# Patient Record
Sex: FEMALE | Race: OTHER | HISPANIC OR LATINO | Employment: FULL TIME | URBAN - METROPOLITAN AREA
[De-identification: names, ages, dates, MRNs, and addresses within clinical notes are randomized per-mention and may not be internally consistent; named-entity substitution may affect disease eponyms.]

---

## 2019-05-30 PROCEDURE — 99284 EMERGENCY DEPT VISIT MOD MDM: CPT

## 2019-05-31 ENCOUNTER — HOSPITAL ENCOUNTER (EMERGENCY)
Facility: MEDICAL CENTER | Age: 49
End: 2019-05-31
Attending: EMERGENCY MEDICINE
Payer: COMMERCIAL

## 2019-05-31 VITALS
TEMPERATURE: 96.7 F | HEART RATE: 48 BPM | RESPIRATION RATE: 12 BRPM | WEIGHT: 180.78 LBS | BODY MASS INDEX: 33.27 KG/M2 | DIASTOLIC BLOOD PRESSURE: 65 MMHG | SYSTOLIC BLOOD PRESSURE: 140 MMHG | OXYGEN SATURATION: 99 % | HEIGHT: 62 IN

## 2019-05-31 DIAGNOSIS — S05.01XA ABRASION OF RIGHT CORNEA, INITIAL ENCOUNTER: ICD-10-CM

## 2019-05-31 DIAGNOSIS — T15.01XA FOREIGN BODY OF RIGHT CORNEA, INITIAL ENCOUNTER: ICD-10-CM

## 2019-05-31 PROCEDURE — 700101 HCHG RX REV CODE 250: Performed by: EMERGENCY MEDICINE

## 2019-05-31 PROCEDURE — 65220 REMOVE FOREIGN BODY FROM EYE: CPT

## 2019-05-31 PROCEDURE — 65205 REMOVE FOREIGN BODY FROM EYE: CPT

## 2019-05-31 RX ORDER — CIPROFLOXACIN HYDROCHLORIDE 3.5 MG/ML
1 SOLUTION/ DROPS TOPICAL ONCE
Status: DISCONTINUED | OUTPATIENT
Start: 2019-05-31 | End: 2019-05-31

## 2019-05-31 RX ORDER — TETRACAINE HYDROCHLORIDE 5 MG/ML
1 SOLUTION OPHTHALMIC ONCE
Status: COMPLETED | OUTPATIENT
Start: 2019-05-31 | End: 2019-05-31

## 2019-05-31 RX ORDER — CIPROFLOXACIN HYDROCHLORIDE 3.5 MG/ML
1 SOLUTION/ DROPS TOPICAL 4 TIMES DAILY
Qty: 1 BOTTLE | Refills: 0 | Status: SHIPPED | OUTPATIENT
Start: 2019-05-31 | End: 2020-07-20

## 2019-05-31 RX ADMIN — TETRACAINE HYDROCHLORIDE 1 DROP: 5 SOLUTION OPHTHALMIC at 00:30

## 2019-05-31 RX ADMIN — FLUORESCEIN SODIUM 1 MG: 1 STRIP OPHTHALMIC at 00:30

## 2019-05-31 ASSESSMENT — ENCOUNTER SYMPTOMS
NECK PAIN: 0
DIZZINESS: 0
DOUBLE VISION: 0
BLURRED VISION: 0
FEVER: 0
HEADACHES: 0
CHILLS: 0
ABDOMINAL PAIN: 0
VOMITING: 0
EYE PAIN: 1
EYE REDNESS: 1
NAUSEA: 0

## 2019-05-31 NOTE — ED PROVIDER NOTES
ED Provider Note    Means of arrival: private vehicle  History obtained from: patient   History limited by: none    CHIEF COMPLAINT  Chief Complaint   Patient presents with   • Eye Pain     started around 7 aylin tonight     • Eye Drainage     watery    • Burning Eyes     R eye  started around dinner time 7 aylin tonight        HPI  Bruna Masterson is a 48 y.o. female who presents to the Emergency Department for right eye pain.  Patient reports that she is a contact lens wearer at baseline.  Earlier this evening around 7 she felt as if something was in her right eye.  Therefore she removed her contacts and rinsed out her eyes thoroughly with continued to have a foreign body sensation in her right eye, her right eye became red and irritated and therefore she came in for further evaluation.  She describes the irritation in the right eye as burning and moderate in severity.  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  Currently she is wearing her glasses.  She denies visual disturbance.    REVIEW OF SYSTEMS  Review of Systems   Constitutional: Negative for chills and fever.   Eyes: Positive for pain and redness. Negative for blurred vision and double vision.   Gastrointestinal: Negative for abdominal pain, nausea and vomiting.   Genitourinary: Negative for dysuria.   Musculoskeletal: Negative for neck pain.   Skin: Negative for rash.   Neurological: Negative for dizziness and headaches.   All other systems reviewed and are negative.        PAST MEDICAL HISTORY   none    SURGICAL HISTORY   has a past surgical history that includes other orthopedic surgery.    SOCIAL HISTORY  Social History   Substance Use  "Topics   • Smoking status: Never Smoker   • Smokeless tobacco: Never Used   • Alcohol use Yes      Comment: once an a while      History   Drug Use No       FAMILY HISTORY  History reviewed. No pertinent family history.    CURRENT MEDICATIONS  Home Medications     Reviewed by Carolina Ambriz R.N. (Registered Nurse) on 05/31/19 at 0000  Med List Status: Partial   Medication Last Dose Status        Patient Cb Taking any Medications                       ALLERGIES  Allergies   Allergen Reactions   • Amoxicillin Anaphylaxis, Hives and Swelling       PHYSICAL EXAM  VITAL SIGNS: /65   Pulse (!) 48   Temp 35.9 °C (96.7 °F) (Temporal)   Resp 12   Ht 1.575 m (5' 2\")   Wt 82 kg (180 lb 12.4 oz)   LMP 05/19/2019 (Exact Date)   SpO2 99%   BMI 33.06 kg/m²   Vitals reviewed by myself.  Physical Exam   Constitutional: She is oriented to person, place, and time and well-developed, well-nourished, and in no distress. No distress.   HENT:   Head: Normocephalic and atraumatic.   Pupils are equal and reactive bilaterally.  Right conjunctive is mildly injected.  There is no swelling or erythema to the eyelids.  Visual acuity is 20/20 in the right eye, 20/30 in the left eye, and 20/20 in both eyes with glasses on.  Pressure is 15 in the left eye and 13 in the right eye.  Fluorescein staining reveals corneal abrasions to the right eye with a small punctate area noted overlying the iris at 11 o'clock.  Negative Aria sign.  Upon slit-lamp examination this small punctate area of fluorescein uptake is consistent with a metallic foreign body.   Neck: Normal range of motion.   Cardiovascular: Normal rate and regular rhythm.    Pulmonary/Chest: Effort normal.   Musculoskeletal: Normal range of motion.   Neurological: She is alert and oriented to person, place, and time.   Skin: Skin is warm and dry.         DIAGNOSTIC STUDIES /  LABS  none    PROCEDURES  Foreign Body Removal Procedure Note    Indication: Foreign metallic " body in the right eye    Procedure: The area of the foreign body was anesthetized using tetracaine eyedrops.  The metallic foreign body was then removed using an 18-gauge needle I was able to flick the metallic foreign body out of the eye.  Patient tolerated the procedure well.  Repeat exam under fluoroscopy seen after removal of the metallic foreign body demonstrates negative Aria sign and no evidence of globe rupture.    The patient tolerated the procedure well.    Complications: None    COURSE & MEDICAL DECISION MAKING  Nursing notes, VS, PMSFHx reviewed in chart.    Patient is a 48-year-old female who comes in for eye pain.  On physical exam she is well-appearing with vitals normal limits.  Exam is consistent with retained metallic foreign body and corneal abrasions in the right eye.  I remove the foreign body, please see procedure note above for details.  Repeat fluorescein staining demonstrates negative Aria sign and no evidence of globe rupture.  For her corneal abrasions as patient is a contact lens wearer I will start her on ciprofloxacin ophthalmic drops and have her follow-up with her .  Patient is agreeable to this plan.  She is then given strict return precautions and discharged home in stable condition.      FINAL IMPRESSION  1. Abrasion of right cornea, initial encounter    2. Foreign body of right cornea, initial encounter

## 2019-05-31 NOTE — ED TRIAGE NOTES
Pt c/o R eye pain, watery and burning sensation that started around 7 aylin tonight  Unknown reason for this per pt   Unable to sleep due to increased pain   Has flushed out eye multiple times w/out effect

## 2019-05-31 NOTE — ED NOTES
Pt discharged with instructions and  1 script.  Pt advised to follow up with her eye doctor in 2 days for recheck.  Pt verbalized understanding of discharge instructions.  Pt ambulated out of ED alone.

## 2019-05-31 NOTE — DISCHARGE INSTRUCTIONS
Please follow-up with your eye doctor within the next 1 to 2 days, do not wear your contacts until this is resolved

## 2020-05-13 ENCOUNTER — TELEPHONE (OUTPATIENT)
Dept: SCHEDULING | Facility: IMAGING CENTER | Age: 50
End: 2020-05-13

## 2020-06-09 ENCOUNTER — TELEPHONE (OUTPATIENT)
Dept: SCHEDULING | Facility: IMAGING CENTER | Age: 50
End: 2020-06-09

## 2020-07-20 ENCOUNTER — OFFICE VISIT (OUTPATIENT)
Dept: MEDICAL GROUP | Facility: MEDICAL CENTER | Age: 50
End: 2020-07-20
Payer: COMMERCIAL

## 2020-07-20 VITALS
WEIGHT: 191 LBS | HEART RATE: 61 BPM | BODY MASS INDEX: 36.06 KG/M2 | HEIGHT: 61 IN | TEMPERATURE: 96.9 F | SYSTOLIC BLOOD PRESSURE: 128 MMHG | DIASTOLIC BLOOD PRESSURE: 76 MMHG | OXYGEN SATURATION: 97 %

## 2020-07-20 DIAGNOSIS — Z11.1 SCREENING FOR TUBERCULOSIS: ICD-10-CM

## 2020-07-20 DIAGNOSIS — N92.6 IRREGULAR MENSES: ICD-10-CM

## 2020-07-20 DIAGNOSIS — E78.5 HYPERLIPIDEMIA WITH TARGET LDL LESS THAN 130: ICD-10-CM

## 2020-07-20 DIAGNOSIS — R63.5 WEIGHT GAIN: ICD-10-CM

## 2020-07-20 DIAGNOSIS — Z00.00 ANNUAL PHYSICAL EXAM: ICD-10-CM

## 2020-07-20 DIAGNOSIS — E66.9 OBESITY (BMI 30-39.9): ICD-10-CM

## 2020-07-20 DIAGNOSIS — Z01.84 IMMUNITY STATUS TESTING: ICD-10-CM

## 2020-07-20 DIAGNOSIS — Z12.11 SCREEN FOR COLON CANCER: ICD-10-CM

## 2020-07-20 PROBLEM — M22.2X1 RIGHT PATELLOFEMORAL SYNDROME: Status: ACTIVE | Noted: 2017-11-10

## 2020-07-20 PROBLEM — N63.20 MASS OF LEFT BREAST: Status: ACTIVE | Noted: 2017-05-04

## 2020-07-20 PROCEDURE — 99386 PREV VISIT NEW AGE 40-64: CPT | Performed by: NURSE PRACTITIONER

## 2020-07-20 PROCEDURE — 86580 TB INTRADERMAL TEST: CPT | Performed by: NURSE PRACTITIONER

## 2020-07-20 RX ORDER — CICLOPIROX 80 MG/ML
SOLUTION TOPICAL
COMMUNITY
Start: 2020-05-14 | End: 2020-07-20

## 2020-07-20 SDOH — HEALTH STABILITY: MENTAL HEALTH: HOW OFTEN DO YOU HAVE A DRINK CONTAINING ALCOHOL?: 4 OR MORE TIMES A WEEK

## 2020-07-20 ASSESSMENT — PATIENT HEALTH QUESTIONNAIRE - PHQ9: CLINICAL INTERPRETATION OF PHQ2 SCORE: 0

## 2020-07-20 NOTE — LETTER
[facility]  SHANNON Naylor.  [address]  [place] [address]  Fax: [phone]   Authorization for Release/Disclosure of   Protected Health Information   Name: AMY GUERRA : 1970 SSN: [id number]   Address: [address] [place] [address] [address] [place] [address] Phone:    [phone] (home)    I authorize the entity listed below to release/disclose the PHI below to:   [facility]/ANITRA Naylor and ANITRA Naylor   Provider or Entity Name:  Ketty Crow M.D.   Address   [facility], [place] [address], [address] [place] [address] Phone: [phone]    Fax: [phone]   Reason for request: continuity of care   Information to be released:    [  ] LAST COLONOSCOPY,  including any PATH REPORT and follow-up  [  ] LAST FIT/COLOGUARD RESULT [  ] LAST DEXA  [  ] LAST MAMMOGRAM  [  ] LAST PAP  [  ] LAST LABS [  ] RETINA EXAM REPORT  [  ] IMMUNIZATION RECORDS  [ XXX ] Release all info      [  ] Check here and initial the line next to each item to release ALL health information INCLUDING  _____ Care and treatment for drug and / or alcohol abuse  _____ HIV testing, infection status, or AIDS  _____ Genetic Testing    DATES OF SERVICE OR TIME PERIOD TO BE DISCLOSED: _____________  I understand and acknowledge that:  * This Authorization may be revoked at any time by you in writing, except if your health information has already been used or disclosed.  * Your health information that will be used or disclosed as a result of you signing this authorization could be re-disclosed by the recipient. If this occurs, your re-disclosed health information may no longer be protected by State or Federal laws.  * You may refuse to sign this Authorization. Your refusal will not affect your ability to obtain treatment.  * This Authorization becomes effective upon signing and will  on (date) __________.      If no date is indicated, this Authorization will   one (1) year from the signature date.    Name: Bruna Masterson    Signature:   Date:     7/20/2020       PLEASE FAX REQUESTED RECORDS BACK TO: [phone]

## 2020-07-20 NOTE — PROGRESS NOTES
"Bruna Masterson is a 49 y.o. female here to establish care and discuss the following:  HPI:    Irregular menses  Patient reports having no menses for about 11 months last year, then had a cycle. Since then she has been getting her menses every few months. She would like to have hormone levels checked to eval for menopause.     Weight gain  Patient reports weight gain despite not changing activity level or diet.     Current medicines (including changes today)  No current outpatient medications on file.     No current facility-administered medications for this visit.      She  has a past medical history of Allergy and Hyperlipidemia.  She  has a past surgical history that includes other orthopedic surgery.  Social History     Tobacco Use   • Smoking status: Never Smoker   • Smokeless tobacco: Never Used   Substance Use Topics   • Alcohol use: Yes     Frequency: 4 or more times a week     Comment: once an a while   • Drug use: No     Social History     Social History Narrative   • Not on file     Family History   Problem Relation Age of Onset   • Glaucoma Mother    • Arthritis Mother    • Heart Disease Father         CHF, MI   • Hyperlipidemia Father    • Heart Disease Paternal Aunt         CVA   • Heart Disease Paternal Uncle         pacers   • Cancer Cousin 60        breast     Family Status   Relation Name Status   • Mo  Alive   • Fa 71    • Bro  Alive   • PAunt  (Not Specified)   • PUnc  (Not Specified)   • Cou  Alive         ROS  No chest pain, no abdominal pain, no rash.  Positive ROS as per HPI.  All other systems reviewed and are negative      Objective:     /76 (BP Location: Right arm, Patient Position: Sitting, BP Cuff Size: Adult)   Pulse 61   Temp 36.1 °C (96.9 °F) (Temporal)   Ht 1.549 m (5' 1\")   Wt 86.6 kg (191 lb)   SpO2 97%  Body mass index is 36.09 kg/m².  Physical Exam:    Constitutional: Alert, no distress.  Skin: Warm, dry, good turgor, no rashes in visible areas.  Eye: Equal, " round and reactive, conjunctiva clear, lids normal.  ENMT: Lips without lesions, good dentition, oropharynx clear.  Neck: Trachea midline, no masses, no thyromegaly. No cervical or supraclavicular lymphadenopathy.  Respiratory: Unlabored respiratory effort, lungs clear to auscultation, no wheezes, no ronchi.  Cardiovascular: Normal S1, S2, no murmur, no edema.  Abdomen: Obese. Soft, non-tender, no masses, no hepatosplenomegaly.  Psych: Alert and oriented x3, normal affect and mood.      Assessment and Plan:   The following treatment plan was discussed    1. Annual physical exam  Check labs, call with results  - CBC WITH DIFFERENTIAL; Future  - Comp Metabolic Panel; Future  - HEMOGLOBIN A1C; Future  - Lipid Profile; Future  - TSH WITH REFLEX TO FT4; Future  - VITAMIN D,25 HYDROXY; Future  - FSH/LH; Future  - ESTRADIOL; Future    2. Irregular menses  Unstable  Likely post-menopausal vs perimenopause  Check labs  - FSH/LH; Future  - ESTRADIOL; Future    3. Weight gain  Unstable  Check labs  - HEMOGLOBIN A1C; Future  - TSH WITH REFLEX TO FT4; Future    4. Hyperlipidemia with target LDL less than 130  - Lipid Profile; Future    5. Screen for colon cancer  - REFERRAL TO GI FOR COLONOSCOPY    6. Screening for tuberculosis  - PPD SKIN TEST    7. Immunity status testing  - SARS CoV-2 Ab, Total; Future      Records reviewed via care everywhere  Followup: Return in about 1 year (around 7/20/2021), or if symptoms worsen or fail to improve.    I have placed the below orders and discussed them with an approved delegating provider. The MA is performing the below orders under the direction of Dr. Nguyen

## 2020-07-20 NOTE — ASSESSMENT & PLAN NOTE
Patient reports having no menses for about 11 months last year, then had a cycle. Since then she has been getting her menses every few months. She would like to have hormone levels checked to eval for menopause.

## 2020-07-20 NOTE — NON-PROVIDER
Zay Masterson is a 49 y.o. female here for a non-provider visit for PPD placement -- Step 1 of 2    Reason for PPD:  work requirement    1. TB evaluation questionnaire completed by patient? Yes      -  If any answers marked yes did you contact a provider prior to placing? Not Indicated  2.  Patient notified to return to clinic for reading on:between 7/22/2020 at 11:58 am - 7/23/2020 at 11:58 am  3.  PPD Placement documentation completed on TB evaluation questionnaire? Yes  4.  Location of TB evaluation questionnaire filed: Draw room in TB folders

## 2020-07-22 ENCOUNTER — NON-PROVIDER VISIT (OUTPATIENT)
Dept: MEDICAL GROUP | Facility: MEDICAL CENTER | Age: 50
End: 2020-07-22

## 2020-07-22 LAB — TB WHEAL 3D P 5 TU DIAM: 0 MM

## 2020-07-22 NOTE — NON-PROVIDER
Zay Masterson is a 49 y.o. female here for a non-provider visit for PPD reading -- Step 1 of 1.      1.  Resulted in Epic under enter/edit results? Yes   2.  TB evaluation questionnaire scanned into chart and original given to patient?Yes      3. Was induration greater than 0 mm? No

## 2020-07-28 ENCOUNTER — HOSPITAL ENCOUNTER (OUTPATIENT)
Dept: LAB | Facility: MEDICAL CENTER | Age: 50
End: 2020-07-28
Attending: NURSE PRACTITIONER
Payer: COMMERCIAL

## 2020-07-28 DIAGNOSIS — Z01.84 IMMUNITY STATUS TESTING: ICD-10-CM

## 2020-07-28 DIAGNOSIS — N92.6 IRREGULAR MENSES: ICD-10-CM

## 2020-07-28 DIAGNOSIS — R63.5 WEIGHT GAIN: ICD-10-CM

## 2020-07-28 DIAGNOSIS — E78.5 HYPERLIPIDEMIA WITH TARGET LDL LESS THAN 130: ICD-10-CM

## 2020-07-28 DIAGNOSIS — Z00.00 ANNUAL PHYSICAL EXAM: ICD-10-CM

## 2020-07-28 LAB
25(OH)D3 SERPL-MCNC: 33 NG/ML (ref 30–100)
ALBUMIN SERPL BCP-MCNC: 4 G/DL (ref 3.2–4.9)
ALBUMIN/GLOB SERPL: 1.2 G/DL
ALP SERPL-CCNC: 76 U/L (ref 30–99)
ALT SERPL-CCNC: 13 U/L (ref 2–50)
ANION GAP SERPL CALC-SCNC: 10 MMOL/L (ref 7–16)
AST SERPL-CCNC: 24 U/L (ref 12–45)
BASOPHILS # BLD AUTO: 1.7 % (ref 0–1.8)
BASOPHILS # BLD: 0.08 K/UL (ref 0–0.12)
BILIRUB SERPL-MCNC: 0.5 MG/DL (ref 0.1–1.5)
BUN SERPL-MCNC: 14 MG/DL (ref 8–22)
CALCIUM SERPL-MCNC: 9.3 MG/DL (ref 8.4–10.2)
CHLORIDE SERPL-SCNC: 104 MMOL/L (ref 96–112)
CHOLEST SERPL-MCNC: 248 MG/DL (ref 100–199)
CO2 SERPL-SCNC: 28 MMOL/L (ref 20–33)
CREAT SERPL-MCNC: 0.91 MG/DL (ref 0.5–1.4)
EOSINOPHIL # BLD AUTO: 0.2 K/UL (ref 0–0.51)
EOSINOPHIL NFR BLD: 4.2 % (ref 0–6.9)
ERYTHROCYTE [DISTWIDTH] IN BLOOD BY AUTOMATED COUNT: 42.5 FL (ref 35.9–50)
EST. AVERAGE GLUCOSE BLD GHB EST-MCNC: 111 MG/DL
ESTRADIOL SERPL-MCNC: 12.2 PG/ML
FASTING STATUS PATIENT QL REPORTED: NORMAL
FSH SERPL-ACNC: 67.2 MIU/ML
GLOBULIN SER CALC-MCNC: 3.4 G/DL (ref 1.9–3.5)
GLUCOSE SERPL-MCNC: 90 MG/DL (ref 65–99)
HBA1C MFR BLD: 5.5 % (ref 0–5.6)
HCT VFR BLD AUTO: 42.6 % (ref 37–47)
HDLC SERPL-MCNC: 85 MG/DL
HGB BLD-MCNC: 14.3 G/DL (ref 12–16)
IMM GRANULOCYTES # BLD AUTO: 0.02 K/UL (ref 0–0.11)
IMM GRANULOCYTES NFR BLD AUTO: 0.4 % (ref 0–0.9)
LDLC SERPL CALC-MCNC: 139 MG/DL
LH SERPL-ACNC: 44.2 IU/L
LYMPHOCYTES # BLD AUTO: 1.04 K/UL (ref 1–4.8)
LYMPHOCYTES NFR BLD: 21.9 % (ref 22–41)
MCH RBC QN AUTO: 31 PG (ref 27–33)
MCHC RBC AUTO-ENTMCNC: 33.6 G/DL (ref 33.6–35)
MCV RBC AUTO: 92.4 FL (ref 81.4–97.8)
MONOCYTES # BLD AUTO: 0.51 K/UL (ref 0–0.85)
MONOCYTES NFR BLD AUTO: 10.8 % (ref 0–13.4)
NEUTROPHILS # BLD AUTO: 2.89 K/UL (ref 2–7.15)
NEUTROPHILS NFR BLD: 61 % (ref 44–72)
NRBC # BLD AUTO: 0 K/UL
NRBC BLD-RTO: 0 /100 WBC
PLATELET # BLD AUTO: 250 K/UL (ref 164–446)
PMV BLD AUTO: 10.6 FL (ref 9–12.9)
POTASSIUM SERPL-SCNC: 4 MMOL/L (ref 3.6–5.5)
PROT SERPL-MCNC: 7.4 G/DL (ref 6–8.2)
RBC # BLD AUTO: 4.61 M/UL (ref 4.2–5.4)
SARS-COV-2 AB SERPL QL IA: NORMAL
SODIUM SERPL-SCNC: 142 MMOL/L (ref 135–145)
TRIGL SERPL-MCNC: 122 MG/DL (ref 0–149)
TSH SERPL DL<=0.005 MIU/L-ACNC: 4.19 UIU/ML (ref 0.38–5.33)
WBC # BLD AUTO: 4.7 K/UL (ref 4.8–10.8)

## 2020-07-28 PROCEDURE — 36415 COLL VENOUS BLD VENIPUNCTURE: CPT

## 2020-07-28 PROCEDURE — 85025 COMPLETE CBC W/AUTO DIFF WBC: CPT

## 2020-07-28 PROCEDURE — 84443 ASSAY THYROID STIM HORMONE: CPT

## 2020-07-28 PROCEDURE — 83001 ASSAY OF GONADOTROPIN (FSH): CPT

## 2020-07-28 PROCEDURE — 82306 VITAMIN D 25 HYDROXY: CPT

## 2020-07-28 PROCEDURE — 82670 ASSAY OF TOTAL ESTRADIOL: CPT

## 2020-07-28 PROCEDURE — 86769 SARS-COV-2 COVID-19 ANTIBODY: CPT

## 2020-07-28 PROCEDURE — 83002 ASSAY OF GONADOTROPIN (LH): CPT

## 2020-07-28 PROCEDURE — 80053 COMPREHEN METABOLIC PANEL: CPT

## 2020-07-28 PROCEDURE — 83036 HEMOGLOBIN GLYCOSYLATED A1C: CPT

## 2020-07-28 PROCEDURE — 80061 LIPID PANEL: CPT

## 2020-08-21 ENCOUNTER — TELEPHONE (OUTPATIENT)
Dept: MEDICAL GROUP | Facility: MEDICAL CENTER | Age: 50
End: 2020-08-21

## 2020-08-21 NOTE — TELEPHONE ENCOUNTER
----- Message from ANITRA Naylor sent at 8/21/2020 10:40 AM PDT -----  Please notify patient that their complete blood count, kidney and liver function, thyroid function, fasting blood sugar and diabetes test,vitamin D, and electrolytes were normal.    Her COVID antibodies were negative, so no recent infection    Her cholesterol is elevated, she should increase vegetables in diet and exercise, decrease fat in diet.     Her Hormone levels indicate she is likely in menopause.     ANITRA Naylor

## 2020-11-27 ENCOUNTER — TELEMEDICINE (OUTPATIENT)
Dept: TELEHEALTH | Facility: TELEMEDICINE | Age: 50
End: 2020-11-27
Payer: COMMERCIAL

## 2020-11-27 DIAGNOSIS — R53.83 FATIGUE, UNSPECIFIED TYPE: ICD-10-CM

## 2020-11-27 DIAGNOSIS — R50.9 FEVER, UNSPECIFIED FEVER CAUSE: ICD-10-CM

## 2020-11-27 PROCEDURE — 99214 OFFICE O/P EST MOD 30 MIN: CPT | Mod: 95,CR,CS | Performed by: PHYSICIAN ASSISTANT

## 2020-11-27 ASSESSMENT — ENCOUNTER SYMPTOMS
FOCAL WEAKNESS: 0
VOMITING: 0
HEADACHES: 0
DIARRHEA: 0
SENSORY CHANGE: 0
WHEEZING: 0
FEVER: 1
NAUSEA: 0
ABDOMINAL PAIN: 1
MYALGIAS: 1
TINGLING: 0
SHORTNESS OF BREATH: 0
COUGH: 0
DIZZINESS: 1
CHILLS: 1
PALPITATIONS: 0

## 2020-11-28 ENCOUNTER — HOSPITAL ENCOUNTER (OUTPATIENT)
Dept: LAB | Facility: MEDICAL CENTER | Age: 50
End: 2020-11-28
Attending: PHYSICIAN ASSISTANT
Payer: COMMERCIAL

## 2020-11-28 LAB
COVID ORDER STATUS COVID19: NORMAL
SARS-COV-2 RNA RESP QL NAA+PROBE: DETECTED
SPECIMEN SOURCE: ABNORMAL

## 2020-11-28 PROCEDURE — C9803 HOPD COVID-19 SPEC COLLECT: HCPCS

## 2020-11-28 PROCEDURE — U0003 INFECTIOUS AGENT DETECTION BY NUCLEIC ACID (DNA OR RNA); SEVERE ACUTE RESPIRATORY SYNDROME CORONAVIRUS 2 (SARS-COV-2) (CORONAVIRUS DISEASE [COVID-19]), AMPLIFIED PROBE TECHNIQUE, MAKING USE OF HIGH THROUGHPUT TECHNOLOGIES AS DESCRIBED BY CMS-2020-01-R: HCPCS

## 2020-11-28 NOTE — PROGRESS NOTES
Telemedicine: Established Patient   This evaluation was conducted via Zoom using secure and encrypted videoconferencing technology. The patient was in a private location in the state of [place].    The patient's identity was confirmed and verbal consent was obtained for this virtual visit.    Subjective:   CC: No chief complaint on file.      Bruna Masterson is a 50 y.o. female presenting for evaluation and management of:    The patient is requesting a COVID test. 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 Her symptoms started 6 days ago. She reports intermittent fevers. Last fever was today- 100.5F. She also complains of fatigue, and intermittent abdominal  Pains in her LUQ after eating. No chest pain, shortness of breath, cough, diarrhea, or vomiting.     Past Medical History:   Diagnosis Date   • Allergy    • Hyperlipidemia        Past Surgical History:   Procedure Laterality Date   • OTHER ORTHOPEDIC SURGERY      R ankle repair 2004  L wrist repair 2008       Family History   Problem Relation Age of Onset   • Glaucoma Mother    • Arthritis Mother    • Heart Disease Father         CHF, MI   • Hyperlipidemia Father    • Heart Disease Paternal Aunt         CVA   • Heart Disease Paternal Uncle         pacers   • Cancer Cousin 60        breast       Allergies   Allergen Reactions   • Amoxicillin Anaphylaxis, Hives and Swelling     Other reaction(s): Angioedema       Medications, Allergies, and current problem list reviewed today in Epic      Review of Systems   Constitutional: Positive for chills, fever and malaise/fatigue.   Respiratory: Negative for cough, shortness of breath and wheezing.    Cardiovascular: Negative for chest pain, palpitations and leg swelling.   Gastrointestinal: Positive for abdominal pain. Negative for diarrhea, nausea and vomiting.   Musculoskeletal: Positive for myalgias.   Skin: Negative for rash.   Neurological: Positive for dizziness. Negative for  tingling, sensory change, focal weakness and headaches.       All other systems reviewed and are negative.     Allergies   Allergen Reactions   • Amoxicillin Anaphylaxis, Hives and Swelling     Other reaction(s): Angioedema       Current medicines (including changes today)  No current outpatient medications on file.     No current facility-administered medications for this visit.        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  She  has a past surgical history that includes other orthopedic surgery.       Objective:   There were no vitals taken for this visit.    Physical Exam   Constitutional: She is oriented to person, place, and time. She appears distressed (mildly ill appearing ).   HENT:   Head: Normocephalic and atraumatic.   Right Ear: External ear normal.   Left Ear: External ear normal.   Nose: Nose normal.   Eyes: Conjunctivae are normal.   Pulmonary/Chest: Effort normal. No respiratory distress. She has no wheezes.   Musculoskeletal: Normal range of motion.   Neurological: She is alert and oriented to person, place, and time. No cranial nerve  deficit.   Skin: Skin is warm. No rash noted. She is diaphoretic. No pallor.   Psychiatric: Mood, memory, affect and judgment normal.       Assessment and Plan:   The following treatment plan was discussed:     1. Fever, unspecified fever cause  - COVID/SARS COV-2 PCR; Future    2. Fatigue, unspecified type  - COVID/SARS COV-2 PCR; Future      Isolation guidelines, conservative measures and ER precautions discussed.     Differential diagnoses, Supportive care, and indications for immediate follow-up discussed with patient.   Pathogenesis of diagnosis discussed including typical length and natural progression.   Instructed to return to clinic or nearest emergency department for any change in condition, further concerns, or worsening of symptoms.    The patient demonstrated a good understanding and agreed with the treatment plan.    Sweta Vance P.A.-C.    Follow-up: No follow-ups on file.

## 2020-12-10 ENCOUNTER — TELEPHONE (OUTPATIENT)
Dept: MEDICAL GROUP | Facility: MEDICAL CENTER | Age: 50
End: 2020-12-10

## 2020-12-10 NOTE — LETTER
December 10, 2020       Patient: Bruna Masterson   YOB: 1970   Date of Visit: 12/10/2020         To Whom It May Concern:    Bruna Masterson tested positive for COVID 19 on 11/28/2020, she has been in self isolation per CDC recommendations. She is clear to return to work on 12/14/2020 without restrictions.     If you have any questions or concerns, please don't hesitate to call [phone]          Sincerely,          Chio Galindo A.P.R.N.  Electronically Signed

## 2020-12-10 NOTE — TELEPHONE ENCOUNTER
VOICEMAIL  1. Caller Name: Bruna Masterson   Call Back Number: [phone] (home)     2. Message: Pt left message requesting note to return to work. Her employer stated that she is okay to come back to work the week of December 14th as long as she has a note from her PCP. She has been on quarantine since 11/21 and was tested positive on 11/28. Please advise.

## 2020-12-14 ENCOUNTER — TELEPHONE (OUTPATIENT)
Dept: MEDICAL GROUP | Facility: MEDICAL CENTER | Age: 50
End: 2020-12-14

## 2020-12-15 NOTE — TELEPHONE ENCOUNTER
1. 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 Pt is experiencing headache, dry cough and small Small amount of shortness of breath when talking which makes her tired. At work she is constantly climbing stairs with equipment.    She is requesting to have another week off of work. She works in [place] every other week. She is requesting another week off of work and to come back on the 21st, she is scheduled to work on the 28th. Please advise.

## 2021-04-26 ENCOUNTER — APPOINTMENT (OUTPATIENT)
Dept: MEDICAL GROUP | Facility: MEDICAL CENTER | Age: 51
End: 2021-04-26
Payer: COMMERCIAL

## 2021-04-28 ENCOUNTER — NON-PROVIDER VISIT (OUTPATIENT)
Dept: MEDICAL GROUP | Facility: MEDICAL CENTER | Age: 51
End: 2021-04-28

## 2021-04-28 DIAGNOSIS — Z11.1 PPD SCREENING TEST: ICD-10-CM

## 2021-04-28 PROCEDURE — 86580 TB INTRADERMAL TEST: CPT | Performed by: NURSE PRACTITIONER

## 2021-04-28 NOTE — PROGRESS NOTES
Zay Masterson is a 50 y.o. female here for a non-provider visit for PPD placement -- Step 1 of 1    Reason for PPD:  work requirement    1. TB evaluation questionnaire completed by patient? Yes      -  If any answers marked yes did you contact a provider prior to placing? Not Indicated  2.  Patient notified to return to clinic for reading on: Friday 4/30/21 after 8:28 AM  3.  PPD Placement documentation completed on TB evaluation questionnaire? Yes  4.  Location of TB evaluation questionnaire filed: [facility] room

## 2021-04-30 ENCOUNTER — NON-PROVIDER VISIT (OUTPATIENT)
Dept: MEDICAL GROUP | Facility: MEDICAL CENTER | Age: 51
End: 2021-04-30
Payer: COMMERCIAL

## 2021-04-30 LAB — TB WHEAL 3D P 5 TU DIAM: NORMAL MM

## 2021-04-30 NOTE — PROGRESS NOTES
Zay Masterson is a 50 y.o. female here for a non-provider visit for PPD reading -- Step 1 of 1.      1.  Resulted in Epic under enter/edit results? Yes   2.  TB evaluation questionnaire scanned into chart and original given to patient?Yes      3. Was induration greater than 0 mm? No.    Routed to PCP? Yes

## 2021-07-08 ENCOUNTER — OFFICE VISIT (OUTPATIENT)
Dept: MEDICAL GROUP | Facility: MEDICAL CENTER | Age: 51
End: 2021-07-08
Payer: COMMERCIAL

## 2021-07-08 VITALS
BODY MASS INDEX: 35.7 KG/M2 | TEMPERATURE: 97.6 F | HEART RATE: 58 BPM | SYSTOLIC BLOOD PRESSURE: 124 MMHG | OXYGEN SATURATION: 93 % | WEIGHT: 194 LBS | DIASTOLIC BLOOD PRESSURE: 78 MMHG | HEIGHT: 62 IN

## 2021-07-08 DIAGNOSIS — M79.601 RIGHT ARM PAIN: ICD-10-CM

## 2021-07-08 DIAGNOSIS — F33.1 MODERATE EPISODE OF RECURRENT MAJOR DEPRESSIVE DISORDER (HCC): ICD-10-CM

## 2021-07-08 DIAGNOSIS — Z12.31 SCREENING MAMMOGRAM, ENCOUNTER FOR: ICD-10-CM

## 2021-07-08 DIAGNOSIS — R22.32 AXILLARY LUMP, LEFT: ICD-10-CM

## 2021-07-08 PROBLEM — F32.9 MAJOR DEPRESSIVE DISORDER: Status: ACTIVE | Noted: 2021-07-08

## 2021-07-08 PROCEDURE — 99214 OFFICE O/P EST MOD 30 MIN: CPT | Performed by: NURSE PRACTITIONER

## 2021-07-08 ASSESSMENT — PATIENT HEALTH QUESTIONNAIRE - PHQ9
5. POOR APPETITE OR OVEREATING: 3 - NEARLY EVERY DAY
SUM OF ALL RESPONSES TO PHQ QUESTIONS 1-9: 11
CLINICAL INTERPRETATION OF PHQ2 SCORE: 2

## 2021-07-08 ASSESSMENT — FIBROSIS 4 INDEX: FIB4 SCORE: 1.36

## 2021-07-08 NOTE — ASSESSMENT & PLAN NOTE
Pain to Right forearm, after injury on Friday or Saturday 7/2-3 moved [employer] board with right upper extremity, (as left upper extremity is injured from work-related injury-strained wrist and tennis elbow).  Reports reduced  strength, pain with pronation, when trying to grab and hold anything. Reports pain radiated to elbow, and rates her pain currently at 1/10.   Has not taken anything for this, or applied heat/cold. Reports it is getting better, and is currently off work for left upper extremity injury, so is able to rest.      Denies fevers, malaise, weakness, nausea, vomiting, diarrhea, constipation, edema, chest pain, or palpitations, Shortness of breath or wheezing

## 2021-07-08 NOTE — ASSESSMENT & PLAN NOTE
Lump in left axilla first identified August 2020 by OB/GYN, US and diagnostic mammogram unremarkable. She has been monitoring for changes since, and has noticed it has been getting bigger over the past 5-6 months.    No pain associated with lump. No other swollen lymph areas. Denies performing SBE, but hasn't noticed any breast changes.  No associated numbness/tingling/weakness to left arm

## 2021-07-08 NOTE — NON-PROVIDER
Lump in left axilla first identified August 2020 by OB/GYN, has been monitoring for changes since, and has noticed it has been getting bigger over the past 5-6 months.  No pain associated with lump. No other swollen lymph areas. Denies performing SBE, but hasn't noticed any breast changes.  No associated numbness/tingling/weakness to left arm    Pain to Right forearm, after injury on Friday or Saturday 7/2-3 moved [employer] board with right upper extremity, (as left upper extremity is injured from work-related injury-strained wrist and tennis elbow).  Reports reduced  strength, pain with pronation, when trying to grab and hold anything. Reports pain radiated to elbow, and rates her pain currently at 1/10.   Has not taken anything for this, or applied heat/cold. Reports it is getting better, and is currently off work for left upper extremity injury, so is able to rest.     Denies fevers, malaise, or weakness  Denies nausea, vomiting, diarrhea, constipation  Denies edema, chest pain, or palpitations  Denies Shortness of breath or wheezing      Left axillary lump is soft, deep to skin, mobile, round, and with mild pain with palpation, located anterior, medial axillary area, at 6 O'clock. no axillary lymph nodes palpated, no lymph nodes palpated to anterior/posterior cervical, submandibular, supraclavicular, or occipital areas. No thyroid nodules. Lump in left axilla is not symmetric with right axilla.     Ears, TM intact, with normal landmarks  Skin is without rashes except scratch on right anterior shoulder, pt reports from her purse or she may have scratched it.  Lungs CTA,   S1S2 without murmur or extra sounds, no edema      Pt reports discomfort with full flexion of right elbow, some tenderness and swelling noted to forearm muscle, unable to compare to left side r/t brace and band in place.

## 2021-07-08 NOTE — PROGRESS NOTES
"Subjective:   Bruna Masterson is a 50 y.o. female here today for the following concerns:    Axillary lump, left  Lump in left axilla first identified August 2020 by OB/GYN, US and diagnostic mammogram unremarkable. She has been monitoring for changes since, and has noticed it has been getting bigger over the past 5-6 months.    No pain associated with lump. No other swollen lymph areas. Denies performing SBE, but hasn't noticed any breast changes.  No associated numbness/tingling/weakness to left arm    Right arm pain  Pain to Right forearm, after injury on Friday or Saturday 7/2-3 moved [employer] board with right upper extremity, (as left upper extremity is injured from work-related injury-strained wrist and tennis elbow).  Reports reduced  strength, pain with pronation, when trying to grab and hold anything. Reports pain radiated to elbow, and rates her pain currently at 1/10.   Has not taken anything for this, or applied heat/cold. Reports it is getting better, and is currently off work for left upper extremity injury, so is able to rest.      Denies fevers, malaise, weakness, nausea, vomiting, diarrhea, constipation, edema, chest pain, or palpitations, Shortness of breath or wheezing       Current medicines (including changes today)  No current outpatient medications on file.     No current facility-administered medications for this visit.     She  has a past medical history of Allergy and Hyperlipidemia.    ROS   No chest pain, no shortness of breath, no abdominal pain  Positive ROS as per HPI.  All other systems reviewed and are negative.     Objective:     /78 (BP Location: Right arm, Patient Position: Sitting, BP Cuff Size: Large adult)   Pulse (!) 58   Temp 36.4 °C (97.6 °F) (Temporal)   Ht 1.568 m (5' 1.75\")   Wt 88 kg (194 lb)   SpO2 93%  Body mass index is 35.77 kg/m².     Physical Exam:  Constitutional: Alert, no distress.  Skin: Warm, dry, good turgor, no rashes in visible " areas.  Eye: Equal, round and reactive, conjunctiva clear, lids normal.  ENMT: Lips without lesions, good dentition, oropharynx clear.  Neck: Trachea midline, no masses, no thyromegaly. No cervical or supraclavicular lymphadenopathy  Respiratory: Unlabored respiratory effort, lungs clear to auscultation, no wheezes, no ronchi.  Cardiovascular: Normal S1, S2, no murmur, no edema.  Psych: Alert and oriented x3, normal affect and mood.  Axillary Exam: Raised, rounded, soft area of fatty tissue anterior left axilla, at 9 O'clock position. No axillary lymph nodes palpated, no lymph nodes palpated to anterior/posterior cervical, submandibular, supraclavicular, or occipital areas. Tender to palpation.  Right Arm: Pt reports discomfort with full flexion of right elbow, some tenderness and swelling noted to forearm muscle, unable to compare to left side r/t brace and band in place.     Assessment and Plan:   The following treatment plan was discussed    1. Right arm pain  Unstable  Advised rest, ice, heat, OTC analgesia as needed    2. Axillary lump, left  Unstable  Feels like fatty tissues or breast tissue  Check US and mammo due to enlargement from last year  - US-BREAST LIMITED-LEFT; Future  - MA-DIAGNOSTIC MAMMO BILAT W/O CAD; Future    3. Screening mammogram, encounter for  - US-BREAST LIMITED-LEFT; Future  - MA-DIAGNOSTIC MAMMO BILAT W/O CAD; Future    4. 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 Does not like to take medication. Will reach out to her Employee Assistance Program for counseling.     Followup: Return if symptoms worsen or fail to improve.    I have placed the below orders and discussed them with an approved delegating provider. The MA is performing the below orders under the direction of Dr. Mason

## 2021-07-26 ENCOUNTER — TELEPHONE (OUTPATIENT)
Dept: MEDICAL GROUP | Facility: MEDICAL CENTER | Age: 51
End: 2021-07-26

## 2021-07-26 ENCOUNTER — HOSPITAL ENCOUNTER (OUTPATIENT)
Dept: RADIOLOGY | Facility: MEDICAL CENTER | Age: 51
End: 2021-07-26
Attending: NURSE PRACTITIONER
Payer: COMMERCIAL

## 2021-07-27 ENCOUNTER — OFFICE VISIT (OUTPATIENT)
Dept: MEDICAL GROUP | Facility: MEDICAL CENTER | Age: 51
End: 2021-07-27
Payer: COMMERCIAL

## 2021-07-27 VITALS
DIASTOLIC BLOOD PRESSURE: 60 MMHG | WEIGHT: 193 LBS | OXYGEN SATURATION: 99 % | TEMPERATURE: 97.2 F | SYSTOLIC BLOOD PRESSURE: 118 MMHG | BODY MASS INDEX: 35.51 KG/M2 | HEIGHT: 62 IN | HEART RATE: 60 BPM

## 2021-07-27 DIAGNOSIS — M79.631 RIGHT FOREARM PAIN: ICD-10-CM

## 2021-07-27 DIAGNOSIS — M77.11 LATERAL EPICONDYLITIS OF RIGHT ELBOW: ICD-10-CM

## 2021-07-27 PROCEDURE — 99213 OFFICE O/P EST LOW 20 MIN: CPT | Performed by: INTERNAL MEDICINE

## 2021-07-27 RX ORDER — NAPROXEN 500 MG/1
500 TABLET ORAL 2 TIMES DAILY WITH MEALS
Qty: 60 TABLET | Refills: 0 | Status: SHIPPED | OUTPATIENT
Start: 2021-07-27

## 2021-07-27 ASSESSMENT — FIBROSIS 4 INDEX: FIB4 SCORE: 1.36

## 2021-07-27 ASSESSMENT — PAIN SCALES - GENERAL: PAINLEVEL: 2=MINIMAL-SLIGHT

## 2021-07-27 NOTE — PROGRESS NOTES
"    Established Patient    Bruna presents today with the following:    CC:  Right forearm pain x 4 weeks    HPI:   Bruna is a 50 y.o. female who came in for above.    She had an injury in her right forearm at the beginning of July.  She was trying to move a heavy marble chess board which weighed about 15 pounds with her right hand alone. She underestimated the weight at that time and started to have right forearm pain after that.  She has weakness in , pain with pronation, supination and some pain in right elbow. ROM is not affected. She is right handed.  She was hoping it would get better by the time her left elbow is better from previous work related injury. She got released back to work last Friday for left elbow and now her right forearm pain is still limited her function with lifting, gripping and turning.      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 60 tablet 0     No current facility-administered medications for this visit.         /60 (BP Location: Left arm, Patient Position: Sitting)   Pulse 60   Temp 36.2 °C (97.2 °F)   Ht 1.575 m (5' 2\")   Wt 87.5 kg (193 lb)   LMP  (LMP Unknown)   SpO2 99%   BMI 35.30 kg/m² "     Physical Exam  General: Alert and oriented, No apparent distress.  MSK: signs of lateral elbow tendinopathy +, no swelling or disalignment in right forearm, pain with pronation > supination, pain in pronator muscles with wrist flexion / extension.  is weaker compared to left, limited by pain    Assessment and Plan    1. Right forearm pain  She could benefit from rest, appropriate physical therapy to recover.  Work excuse given for 3 weeks to allow PT sessions and recovery.  - REFERRAL TO PHYSICAL THERAPY  - refilled naproxen (NAPROSYN) 500 MG Tab; Take 1 tablet by mouth 2 times a day with meals.  Dispense: 60 tablet; Refill: 0    2. Lateral epicondylitis of right elbow  Recommend elbow sleeve.  - REFERRAL TO PHYSICAL THERAPY        Return if symptoms worsen or fail to improve.       Signed by: Inés Ruvalcaba M.D.

## 2021-07-27 NOTE — LETTER
July 27, 2021    To whom it may concern,     Zay Masterson has been evaluated in the clinic. She had a right forearm injury and needs to be off work until 8/17/2021. She can go back to work if she has symptom improvement with physical therapy by 8/17/2021.             Inés Ruvalcaba M.D.

## 2021-08-02 ENCOUNTER — HOSPITAL ENCOUNTER (OUTPATIENT)
Dept: RADIOLOGY | Facility: MEDICAL CENTER | Age: 51
End: 2021-08-02
Payer: COMMERCIAL

## 2021-08-03 ENCOUNTER — HOSPITAL ENCOUNTER (OUTPATIENT)
Dept: RADIOLOGY | Facility: MEDICAL CENTER | Age: 51
End: 2021-08-03
Payer: COMMERCIAL

## 2021-08-05 ENCOUNTER — PHYSICAL THERAPY (OUTPATIENT)
Dept: PHYSICAL THERAPY | Facility: MEDICAL CENTER | Age: 51
End: 2021-08-05
Attending: INTERNAL MEDICINE
Payer: COMMERCIAL

## 2021-08-05 DIAGNOSIS — M77.11 RIGHT LATERAL EPICONDYLITIS: ICD-10-CM

## 2021-08-05 DIAGNOSIS — M79.631 RIGHT FOREARM PAIN: ICD-10-CM

## 2021-08-05 PROCEDURE — 97161 PT EVAL LOW COMPLEX 20 MIN: CPT

## 2021-08-05 PROCEDURE — 97014 ELECTRIC STIMULATION THERAPY: CPT

## 2021-08-05 PROCEDURE — 97110 THERAPEUTIC EXERCISES: CPT

## 2021-08-05 SDOH — ECONOMIC STABILITY: GENERAL: QUALITY OF LIFE: GOOD

## 2021-08-05 ASSESSMENT — ENCOUNTER SYMPTOMS
PAIN LOCATION: R ELBOW/FOREARM
EXACERBATED BY: LIFTING
ALLEVIATING FACTORS: BRACE
PAIN SCALE AT HIGHEST: 9
PAIN TIMING: IN THE EVENING
QUALITY: SHARP
QUALITY: ACHING
QUALITY: DEEP
PAIN SCALE: 1
PAIN SCALE AT LOWEST: 1
EXACERBATED BY: ACTIVITY

## 2021-08-05 NOTE — OP THERAPY EVALUATION
Outpatient Physical Therapy  INITIAL EVALUATION    [facility] Outpatient Physical Therapy  [address]  [place] [address]  Phone:  [phone]  Fax:  [phone]    Date of Evaluation: 2021    Patient: Zay Masterson  YOB: 1970  MRN: [id number]     Referring Provider: Inés Ruvalcaba M.D.  [address]  [address]  [place] [address]   Referring Diagnosis Right forearm pain [M79.631];Lateral epicondylitis of right elbow [M77.11]     Time Calculation  Start time: 1300  Stop time: 1359 Time Calculation (min): 59 minutes         Chief Complaint: Elbow Problem    Visit Diagnoses     ICD-10-CM   1. Right forearm pain  M79.631   2. Right lateral epicondylitis  M77.11       Date of onset of impairment: 2021    Subjective:   History of Present Illness:     Date of onset:  2021    Mechanism of injury:  4 weeks ago, R forearm pain; was lifting a marble chessboard and went to extend elbow and had pain in her forearm. The chessboard was heavier than she anticipated.  She has pain constantly and has hard time using her arm. 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    Social history: walking, swimming, working out  Quality of life:  Good  Prior level of function:  Indepedent  Headaches:  no headaches  Ear problems: none  Sleep disturbance:  Interrupted sleep and non-restful sleep  Pain:     Current pain ratin    At best pain ratin    At worst pain ratin    Location:  R elbow/forearm    Quality:  Aching, deep and sharp    Pain timing:  In the evening (hard to find a comfortable position at night)    Relieving factors:  Brace    Aggravating factors:  Activity and lifting    Progression:  Stable  Hand dominance:  Right  Diagnostic Tests:     None     Treatments:     None        Past Medical History:   Diagnosis Date   • Allergy    • Hyperlipidemia      Past Surgical History:   Procedure Laterality Date   • OTHER ORTHOPEDIC SURGERY      R ankle repair 2004  L wrist repair 2008     Social History     Tobacco Use   • Smoking status: Never Smoker   • Smokeless tobacco: Never Used   Substance Use Topics   • Alcohol use: Yes     Comment: once an a while     Family and Occupational History     Socioeconomic History   • Marital status:      Spouse name: Not on file   • Number of children: Not on file   • Years of education: Not on file   • Highest education level: Not on file   Occupational History   • Not on file       Objective     Neurological Testing     Reflexes   Left   Biceps (C5/C6): normal (2+)  Brachioradialis (C6): normal (2+)  Triceps (C7): normal (2+)    Right   Biceps (C5/C6): normal (2+)  Brachioradialis (C6): normal (2+)  Triceps (C7): normal (2+)    Palpation     Right   Hypertonic in the wrist extensors.   Tenderness of the brachioradialis, supinator and wrist extensors.     Tenderness     Right Elbow   Tenderness in the lateral epicondyle.     Right Wrist/Hand   Tenderness in the lateral epicondyle.     Active Range of Motion     Left Elbow   Normal active range of motion    Right Elbow   Normal active range of motion    Strength:      Left Elbow   Normal strength    Right Elbow   Normal strength        Therapeutic Exercises (CPT 66442):     2. HEP review, discussed not usnig more than 2# weights; pt had 5# which were too heavy    Therapeutic Treatments and Modalities:     1. E Stim Unattended (CPT 49500), IFC with MHP to R forearm    Time-based treatments/modalities:    Physical Therapy Timed Treatment Charges  Therapeutic exercise minutes (CPT 83030): 19 minutes      Assessment, Response and Plan:   Impairments: activity intolerance, difficulty performing job, hypersensitivity, lacks appropriate home exercise program, limited ADL's and pain  with function    Assessment details:  The patient is a 50 year old female who presents to PT Evaluation with complaints of  R forearm/elbow pain following injury when lifting a heavy chessboard approximately 4 weeks ago. 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   Barriers to therapy:  None  Goals:   Short Term Goals:   1. The patient will demonstrate HEP independently  2. The patient will be able to open door/can without increased elbow pain.   Short term goal time span:  2-4 weeks      Long Term Goals:    1. The patient will be able to lift an object weighing at least 10 lbs with her R hand  2. The patient will be able to cook dinner without increased elbow pain.   Long term goal time span:  4-6 weeks    Plan:   Therapy options:  Physical therapy treatment to continue  Planned therapy interventions:  E Stim Unattended (CPT 31311), Manual Therapy (CPT 10792) and Therapeutic Exercise (CPT 60390)  Frequency:  2x week  Duration in weeks:  6  Duration in visits:  12  Discussed with:  Patient      Functional Assessment Used  PT Functional Assessment Tool Used: UEFI  PT Functional Assessment Score: 44/80     Referring provider co-signature:  I have reviewed this plan of care and my co-signature certifies the need for services.    Certification Period: 08/05/2021 to  09/16/21    Physician Signature: ________________________________ Date: ______________

## 2021-08-12 ENCOUNTER — PATIENT MESSAGE (OUTPATIENT)
Dept: MEDICAL GROUP | Facility: MEDICAL CENTER | Age: 51
End: 2021-08-12

## 2021-08-12 ENCOUNTER — PHYSICAL THERAPY (OUTPATIENT)
Dept: PHYSICAL THERAPY | Facility: MEDICAL CENTER | Age: 51
End: 2021-08-12
Attending: INTERNAL MEDICINE
Payer: COMMERCIAL

## 2021-08-12 DIAGNOSIS — M77.11 RIGHT LATERAL EPICONDYLITIS: ICD-10-CM

## 2021-08-12 PROCEDURE — 97014 ELECTRIC STIMULATION THERAPY: CPT

## 2021-08-12 PROCEDURE — 97140 MANUAL THERAPY 1/> REGIONS: CPT

## 2021-08-12 PROCEDURE — 97110 THERAPEUTIC EXERCISES: CPT

## 2021-08-12 NOTE — OP THERAPY DAILY TREATMENT
Outpatient Physical Therapy  DAILY TREATMENT     [facility] Outpatient Physical Therapy  [address]  [place] [address]  Phone:  [phone]  Fax:  [phone]    Date: 08/12/2021    Patient: Zay Masterson  YOB: 1970  MRN: [id number]     Time Calculation    Start time: 1315  Stop time: 1413 Time Calculation (min): 58 minutes         Chief Complaint: Elbow Injury    Visit #: 2    SUBJECTIVE:  Patient reports that with rest and decreased use of R , she has noted a decrease in pain. Will be reaching out to referring physician as she is supposed to return to work next week.     OBJECTIVE:  Current objective measures:           Therapeutic Exercises (CPT 15542):     1. UBE L1, 2:00 forward/2:00 backward , Patient reports overcompensation with LUE at end of exercise.     2. Wrist extension stretch , 2 x 30 seconds    3. Wrist flexion stretch , 2 x 30 seconds     4. Wrist extension against gravity, 2 x 20 (to neutral), Added to HEP     5. Wrist flexion against gravity, 2 x 20, Added to HEP    6. Wrist pronation/supination, 2 x 20 , Added to HEP    7. Finger extension, 2 x 20 (small rubber band) , Added to HEP     Therapeutic Treatments and Modalities:     1. E Stim Unattended (CPT 11820), IFC with MHP to R forearm    2. Manual Therapy (CPT 70647), CFM to R wrist extensors near lateral epicondyle insertion, joint mobilizations grade II-III into wrist supination.     Time-based treatments/modalities:    Physical Therapy Timed Treatment Charges  Manual therapy minutes (CPT 25305): 15 minutes  Therapeutic exercise minutes (CPT 44832): 28 minutes      ASSESSMENT:   Response to treatment: Reported compensation with LUE during UBE, therefore not an appropriate activity for patient at this time. Tolerated working against gravity and light resistance without significant increase in pain.     PLAN/RECOMMENDATIONS:   Plan for treatment: therapy treatment to continue next  visit.  Planned interventions for next visit: continue with current treatment.

## 2021-08-12 NOTE — LETTER
August 17, 2021    To whom it may concern,     Zay Masterson has been seeing physical therapy. Based on her current progress, she will need an extension to be off work until 9/7/2021. Please contact us with questions or concerns.          Inés Ruvalcaba M.D.

## 2021-08-13 ENCOUNTER — HOSPITAL ENCOUNTER (OUTPATIENT)
Dept: RADIOLOGY | Facility: MEDICAL CENTER | Age: 51
End: 2021-08-13
Attending: NURSE PRACTITIONER
Payer: COMMERCIAL

## 2021-08-13 DIAGNOSIS — Z12.31 SCREENING MAMMOGRAM, ENCOUNTER FOR: ICD-10-CM

## 2021-08-13 DIAGNOSIS — R22.32 AXILLARY LUMP, LEFT: ICD-10-CM

## 2021-08-13 PROCEDURE — 76642 ULTRASOUND BREAST LIMITED: CPT | Mod: LT

## 2021-08-13 PROCEDURE — G0279 TOMOSYNTHESIS, MAMMO: HCPCS

## 2021-08-16 NOTE — TELEPHONE ENCOUNTER
VOICEMAIL  1. Caller Name: Bruna Masterson  Call Back Number: [phone]    2. Message: Pt requesting another doctor's note to stay out of work so pt can continue PT. Original note states that pt returns to work tomorrow, Tuesday 08/17/2021, but pt would like extension. Please advise.

## 2021-08-20 ENCOUNTER — PHYSICAL THERAPY (OUTPATIENT)
Dept: PHYSICAL THERAPY | Facility: MEDICAL CENTER | Age: 51
End: 2021-08-20
Attending: INTERNAL MEDICINE
Payer: COMMERCIAL

## 2021-08-20 DIAGNOSIS — M77.11 RIGHT LATERAL EPICONDYLITIS: ICD-10-CM

## 2021-08-20 PROCEDURE — 97014 ELECTRIC STIMULATION THERAPY: CPT

## 2021-08-20 PROCEDURE — 97110 THERAPEUTIC EXERCISES: CPT

## 2021-08-20 PROCEDURE — 97140 MANUAL THERAPY 1/> REGIONS: CPT

## 2021-08-20 NOTE — OP THERAPY DAILY TREATMENT
Outpatient Physical Therapy  DAILY TREATMENT     [facility] Outpatient Physical Therapy  [address]  [place] [address]  Phone:  [phone]  Fax:  [phone]    Date: 08/20/2021    Patient: Zay Masterson  YOB: 1970  MRN: [id number]     Time Calculation    Start time: 0800  Stop time: 0900 Time Calculation (min): 60 minutes         Chief Complaint: Elbow Problem    Visit #: 3    SUBJECTIVE:  Patient reports that she has continued to have a decrease in pain; she did get a new note from her doctor and she doesn't return to work until 7SEP.     OBJECTIVE:  Current objective measures:   Tightness with TTP to R elbow  Patient presents wearing brace at R forearm          Therapeutic Exercises (CPT 83487):     1. UBE L1, 3:00 forward/3:00 backward , Patient reports overcompensation with LUE at end of exercise.     2. Wrist extension stretch , 2 x 30 seconds    3. Wrist flexion stretch , 2 x 30 seconds     4. Wrist extension against gravity, 2 x 20 (to neutral)    5. Wrist flexion against gravity, 2 x 20    6. Wrist pronation/supination, 2 x 20     7. Finger extension, 2 x 20 (small rubber band)     Therapeutic Treatments and Modalities:     1. E Stim Unattended (CPT 91162), IFC with MHP to R forearm    2. Manual Therapy (CPT 92403), CFM to R wrist extensors near lateral epicondyle insertion, joint mobilizations grade II-III into wrist supination.     Time-based treatments/modalities:    Physical Therapy Timed Treatment Charges  Manual therapy minutes (CPT 50336): 18 minutes  Therapeutic exercise minutes (CPT 97359): 24 minutes      ASSESSMENT:   Response to treatment: the patient was able to tolerate UBE better today without compensation. She reports no issue. Tolerated working against gravity and light resistance without significant increase in pain--overall slow steady progress.     PLAN/RECOMMENDATIONS:   Plan for treatment: therapy treatment to continue next  visit.  Planned interventions for next visit: continue with current treatment.

## 2021-08-23 ENCOUNTER — APPOINTMENT (OUTPATIENT)
Dept: PHYSICAL THERAPY | Facility: MEDICAL CENTER | Age: 51
End: 2021-08-23
Attending: INTERNAL MEDICINE
Payer: COMMERCIAL

## 2021-08-30 ENCOUNTER — TELEMEDICINE (OUTPATIENT)
Dept: MEDICAL GROUP | Facility: MEDICAL CENTER | Age: 51
End: 2021-08-30
Payer: COMMERCIAL

## 2021-08-30 VITALS — BODY MASS INDEX: 35.44 KG/M2 | TEMPERATURE: 97.3 F | HEIGHT: 62 IN | WEIGHT: 192.6 LBS

## 2021-08-30 DIAGNOSIS — M79.601 RIGHT ARM PAIN: ICD-10-CM

## 2021-08-30 DIAGNOSIS — E66.9 OBESITY (BMI 30-39.9): ICD-10-CM

## 2021-08-30 PROCEDURE — 99213 OFFICE O/P EST LOW 20 MIN: CPT | Mod: 95 | Performed by: NURSE PRACTITIONER

## 2021-08-30 ASSESSMENT — FIBROSIS 4 INDEX: FIB4 SCORE: 1.36

## 2021-08-30 NOTE — LETTER
August 30, 2021       Patient: Bruna Masterson   YOB: 1970   Date of Visit: 8/30/2021         To Whom It May Concern:    In my medical opinion, I recommend that Bruna Masterson remain out of work until 9/24/2021, [facility] paperwork has been completed.     If you have any questions or concerns, please don't hesitate to call [phone]          Sincerely,          Chio Galindo A.P.R.N.  Electronically Signed

## 2021-08-30 NOTE — ASSESSMENT & PLAN NOTE
Ongoing since 7/2/2021 after injury while moving marble chess board with right upper extremity, (as left upper extremity was injured from work-related injury-strained wrist and tennis elbow).  Reports reduced  strength, pain with pronation, when trying to grab and hold anything. Reports pain radiated to elbow.     Currently taking naproxen BID as needed and seeing PT weekly for treatment. 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 She is requesting [facility] paperwork to be done at this time.

## 2021-08-30 NOTE — PROGRESS NOTES
Telemedicine Visit: Established Patient     This encounter was conducted via Zoom.   Verbal consent was obtained. Patient's identity was verified.    Subjective:   CC: FMLA paperwork  Bruna Masterson is a 50 y.o. female presenting for evaluation and management of:    Right arm pain  Ongoing since 7/2/2021 after injury while moving [employer] board with right upper extremity, (as left upper extremity was injured from work-related injury-strained wrist and tennis elbow).  Reports reduced  strength, pain with pronation, when trying to grab and hold anything. Reports pain radiated to elbow.     Currently taking naproxen BID as needed and seeing PT weekly for treatment. 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 She is requesting [place] paperwork to be done at this time.        ROS   Positive ROS as per HPI. All other systems reviewed and are negative.    Allergies   Allergen Reactions   • Amoxicillin Anaphylaxis, Hives and Swelling     Other reaction(s): Angioedema       Current medicines (including changes today)  Current Outpatient Medications   Medication Sig Dispense Refill   • naproxen (NAPROSYN) 500 MG Tab Take 1 tablet by mouth 2 times a day with meals. 60 tablet 0     No current facility-administered medications for this visit.       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  She  has a past surgical history that includes other orthopedic surgery.       Objective:   Temp 36.3 °C (97.3 °F) (Tympanic)   Ht 1.575 m (5' 2\")   Wt 87.4 kg (192 lb 9.6 oz)   BMI 35.23 kg/m²     Physical Exam:  Constitutional: Alert, no distress, well-groomed.  Skin: No rashes in visible areas.  Eye: Round. Conjunctiva clear, lids normal. No icterus.   ENMT: Lips pink without lesions, good dentition, moist mucous membranes. Phonation normal.  Neck: No masses, no thyromegaly. Moves freely without pain.  CV: Pulse as reported by patient  Respiratory: Unlabored respiratory effort, no cough or audible wheeze  Psych: Alert and oriented x3, normal affect and mood.       Assessment and Plan:   The following treatment plan was discussed:     1. Right arm pain  Unstable  Continue PT weekly  Continue naproxen BID PRN  FMLA paperwork completed today with return to work date 9/24/2021  Note written.       Follow-up: Return if symptoms worsen or fail to improve.            "

## 2021-09-03 ENCOUNTER — PHYSICAL THERAPY (OUTPATIENT)
Dept: PHYSICAL THERAPY | Facility: MEDICAL CENTER | Age: 51
End: 2021-09-03
Attending: INTERNAL MEDICINE
Payer: COMMERCIAL

## 2021-09-03 DIAGNOSIS — M77.11 RIGHT LATERAL EPICONDYLITIS: ICD-10-CM

## 2021-09-03 PROCEDURE — 97014 ELECTRIC STIMULATION THERAPY: CPT

## 2021-09-03 PROCEDURE — 97140 MANUAL THERAPY 1/> REGIONS: CPT

## 2021-09-03 PROCEDURE — 97110 THERAPEUTIC EXERCISES: CPT

## 2021-09-03 NOTE — OP THERAPY DAILY TREATMENT
Outpatient Physical Therapy  DAILY TREATMENT     [facility] Outpatient Physical Therapy  [address]  [place] [address]  Phone:  [phone]  Fax:  [phone]    Date: 09/03/2021    Patient: Zay Masterson  YOB: 1970  MRN: [id number]     Time Calculation    Start time: 1422  Stop time: 1530 Time Calculation (min): 68 minutes         Chief Complaint: Elbow Problem    Visit #: 4    SUBJECTIVE:  Patient reports that she followed up with PCP and that she will now return to work 9/24 and [facility] paperwork has been filed. The patient reports significant improvement in pain and function.       OBJECTIVE:  Current objective measures:   Mild TTP R elbow  Patient presents no longer wearing brace  Full ROM with WFL quality  R hand: 18lbs, 18.5, 18; average: 18.16  L hand: 19lbs, 18, 19; average: 18.66          Therapeutic Exercises (CPT 12956):     1. UBE L1, 4:00 forward/4:00 backward , Patient reports overcompensation with LUE at end of exercise.     2. Wrist extension stretch , 2 x 30 seconds    3. Wrist flexion stretch , 2 x 30 seconds     4. Wrist extension against gravity, 2 x 20 (to neutral) 3#    5. Wrist flexion against gravity, 2 x 20 3#    6. Wrist pronation/supination, 2 x 20 3#    7. Finger extension, 2 x 20 (small rubber band)     8. Bicep curls, 5#, 2x20    9. Wall washers, x20    Therapeutic Treatments and Modalities:     1. E Stim Unattended (CPT 05138), IFC with MHP to R forearm    2. Manual Therapy (CPT 10302), CFM to R wrist extensors near lateral epicondyle insertion, joint mobilizations grade II-III into wrist supination, IASTM to R wrist extensors    Time-based treatments/modalities:    Physical Therapy Timed Treatment Charges  Manual therapy minutes (CPT 48797): 14 minutes  Therapeutic exercise minutes (CPT 72645): 38 minutes      ASSESSMENT:   Response to treatment: the patient tolerated the arm bike without a problem, she tolerated treatment well without  issue and shows improved quality of motion. Strength shows good return, particularly in  strength, while dominant side is slightly less than non, she does have a history of injury on both sides. Tolerated working against gravity with increased resistance today, likely only needs x1 more appointment prior to DC. .     PLAN/RECOMMENDATIONS:   Plan for treatment: therapy treatment to continue next visit.  Planned interventions for next visit: continue with current treatment.

## 2021-09-03 NOTE — OP THERAPY DAILY TREATMENT
"  Outpatient Physical Therapy  DAILY TREATMENT     [facility] Outpatient Physical Therapy  [address]  [place] [address]  Phone:  [phone]  Fax:  [phone]    Date: 09/03/2021    Patient: Zay Masterson  YOB: 1970  MRN: [id number]     Time Calculation                   Chief Complaint: No chief complaint on file.    Visit #: 4    SUBJECTIVE:  ***    OBJECTIVE:  Current objective measures: ***        Exercises/Treatment  Time-based treatments/modalities:           Pain rating (1-10) before treatment:  {PAIN NUMBERS_1-10:59799}  Pain rating (1-10) after treatment:  {PAIN NUMBERS_1-10:44976}    ASSESSMENT:   Response to treatment: ***    PLAN/RECOMMENDATIONS:   Plan for treatment: {AMB OP THERAPY - THERAPY PLAN:057962873::\"therapy treatment to continue next visit\"}.  Planned interventions for next visit: {PT PLANNED THERAPY INTERVENTIONS:057826119::\"continue with current treatment\"}.       "

## 2021-09-08 ENCOUNTER — PHYSICAL THERAPY (OUTPATIENT)
Dept: PHYSICAL THERAPY | Facility: MEDICAL CENTER | Age: 51
End: 2021-09-08
Attending: INTERNAL MEDICINE
Payer: COMMERCIAL

## 2021-09-08 DIAGNOSIS — M77.11 RIGHT LATERAL EPICONDYLITIS: ICD-10-CM

## 2021-09-08 PROCEDURE — 97110 THERAPEUTIC EXERCISES: CPT

## 2021-09-08 PROCEDURE — 97014 ELECTRIC STIMULATION THERAPY: CPT

## 2021-09-08 PROCEDURE — 97140 MANUAL THERAPY 1/> REGIONS: CPT

## 2021-09-08 NOTE — OP THERAPY DAILY TREATMENT
Outpatient Physical Therapy  DAILY TREATMENT     [facility] Outpatient Physical Therapy  [address]  [place] [address]  Phone:  [phone]  Fax:  [phone]    Date: 09/08/2021    Patient: Zay Masterson  YOB: 1970  MRN: [id number]     Time Calculation    Start time: 0845  Stop time: 0945 Time Calculation (min): 60 minutes         Chief Complaint: Elbow Problem    Visit #: 5    SUBJECTIVE:  Patient reports that she isn't sure what she did over the weekend but now she has increased soreness in the elbow and feels like she has lost ROM.      OBJECTIVE:  Current objective measures:   Mild TTP R elbow  Patient presents no longer wearing brace  Full ROM with WFL quality  R hand: 18lbs, 18.5, 18; average: 18.16  L hand: 19lbs, 18, 19; average: 18.66          Therapeutic Exercises (CPT 41944):     1. UBE L1, 4:00 forward/4:00 backward , Patient reports overcompensation with LUE at end of exercise.     2. Wrist extension stretch , 2 x 30 seconds    3. Wrist flexion stretch , 2 x 30 seconds     4. Wrist extension against gravity, 2 x 20 (to neutral) 3#    5. Wrist flexion against gravity, 2 x 20 3#    6. Wrist pronation/supination, 2 x 20 3#    7. Finger extension, 2 x 20 (small rubber band)     8. Bicep curls, 5#, 2x20    9. Wall washers, x20    Therapeutic Treatments and Modalities:     1. E Stim Unattended (CPT 05556), IFC with MHP to R forearm    2. Manual Therapy (CPT 62276), CFM to R wrist extensors near lateral epicondyle insertion, joint mobilizations grade II-III into wrist supination, IASTM to R wrist extensors    Time-based treatments/modalities:    Physical Therapy Timed Treatment Charges  Manual therapy minutes (CPT 62763): 10 minutes  Therapeutic exercise minutes (CPT 35520): 35 minutes      ASSESSMENT:   Response to treatment: the patient tolerated the arm bike without a problem, she tolerated treatment well without issue and shows improved quality of  motion. Strength shows good return, particularly in  strength, while dominant side is slightly less than non, she does have a history of injury on both sides. Tolerated working against gravity with increased resistance today, discussed taking a couple weeks off and returning to PT x1 visit prior to return to work.     PLAN/RECOMMENDATIONS:   Plan for treatment: therapy treatment to continue next visit.  Planned interventions for next visit: continue with current treatment.

## 2021-12-10 ENCOUNTER — TELEMEDICINE (OUTPATIENT)
Dept: MEDICAL GROUP | Facility: MEDICAL CENTER | Age: 51
End: 2021-12-10

## 2021-12-10 VITALS — HEIGHT: 62 IN | BODY MASS INDEX: 35.33 KG/M2 | WEIGHT: 192 LBS

## 2021-12-10 DIAGNOSIS — Z02.89 ENCOUNTER FOR COMPLETION OF FORM WITH PATIENT: ICD-10-CM

## 2021-12-10 PROCEDURE — 7101 PR PHYSICAL: Mod: 95 | Performed by: NURSE PRACTITIONER

## 2021-12-10 ASSESSMENT — FIBROSIS 4 INDEX: FIB4 SCORE: 1.38

## 2021-12-10 NOTE — ASSESSMENT & PLAN NOTE
Patient requesting [facility] paperwork to be filled out today as her  is currently in 30 day rehab and she has to care for her elderly mother who cannot be left home alone as well as her 9 year old son. She is requesting time off from 12/7/2021-12/31/2021

## 2021-12-10 NOTE — PROGRESS NOTES
Telemedicine Visit: Established Patient     This encounter was conducted via Zoom.   Verbal consent was obtained. Patient's identity was verified.    Subjective:   CC: FMLA paperwork  Bruna Masterson is a 51 y.o. female presenting for evaluation and management of:    Encounter for completion of form with patient  Patient requesting FMLA paperwork to be filled out today as her  is currently in 30 day rehab and she has to care for her elderly mother who cannot be left home alone as well as her 9 year old son. She is requesting time off from 12/7/2021-12/31/2021       ROS   Positive ROS as per HPI. All other systems reviewed and are negative.    Allergies   Allergen Reactions   • Amoxicillin Anaphylaxis, Hives and Swelling     Other reaction(s): Angioedema       Current medicines (including changes today)  Current Outpatient Medications   Medication Sig Dispense Refill   • naproxen (NAPROSYN) 500 MG Tab Take 1 tablet by mouth 2 times a day with meals. 60 tablet 0     No current facility-administered medications for this visit.       Patient Active Problem List    Diagnosis Date Noted   • Encounter for completion of form with patient 12/10/2021   • Axillary lump, left 07/08/2021   • Right arm pain 07/08/2021   • Major depressive disorder 07/08/2021   • Irregular menses 07/20/2020   • Weight gain 07/20/2020   • Obesity (BMI 30-39.9) 07/20/2020   • Right patellofemoral syndrome 11/10/2017   • Mass of left breast 05/04/2017   • PMDD (premenstrual dysphoric disorder) 12/02/2016   • Right knee sprain 01/13/2016   • Hyperlipidemia with target LDL less than 130 01/12/2015   • Allergy status to other antibiotic agents status 08/17/2010   • Allergy status to other antibiotic agents 08/17/2010   • 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  She  has a past surgical history that includes other orthopedic surgery.       Objective:   Ht 1.575 m (5' 2\")   Wt 87.1 kg (192 lb)   BMI 35.12 kg/m²     Physical Exam:  Constitutional: Alert, no distress, well-groomed.  Skin: No rashes in visible areas.  Eye: Round. Conjunctiva clear, lids normal. No icterus.   ENMT: Lips pink without lesions, good dentition, moist mucous membranes. Phonation normal.  Neck: No masses, no thyromegaly. Moves freely without pain.  CV: Pulse as reported by patient  Respiratory: Unlabored respiratory effort, no cough or audible wheeze  Psych: Alert and oriented x3, normal affect and mood.       Assessment and Plan:   The following treatment plan was discussed:     1. Encounter for completion of form with patient  Form completed with patient, left at  for her to .   See media       Follow-up: Return if symptoms worsen or fail to improve.            "

## 2021-12-20 ENCOUNTER — OFFICE VISIT (OUTPATIENT)
Dept: MEDICAL GROUP | Facility: MEDICAL CENTER | Age: 51
End: 2021-12-20
Payer: COMMERCIAL

## 2021-12-20 ENCOUNTER — TELEPHONE (OUTPATIENT)
Dept: MEDICAL GROUP | Facility: MEDICAL CENTER | Age: 51
End: 2021-12-20

## 2021-12-20 VITALS
HEIGHT: 62 IN | HEART RATE: 72 BPM | SYSTOLIC BLOOD PRESSURE: 116 MMHG | OXYGEN SATURATION: 96 % | WEIGHT: 168 LBS | BODY MASS INDEX: 30.91 KG/M2 | DIASTOLIC BLOOD PRESSURE: 76 MMHG | TEMPERATURE: 96.8 F

## 2021-12-20 DIAGNOSIS — E78.5 HYPERLIPIDEMIA WITH TARGET LDL LESS THAN 130: ICD-10-CM

## 2021-12-20 DIAGNOSIS — Z12.11 SCREEN FOR COLON CANCER: ICD-10-CM

## 2021-12-20 DIAGNOSIS — Z00.00 ANNUAL PHYSICAL EXAM: ICD-10-CM

## 2021-12-20 PROCEDURE — 99396 PREV VISIT EST AGE 40-64: CPT | Performed by: NURSE PRACTITIONER

## 2021-12-20 RX ORDER — SULFAMETHOXAZOLE AND TRIMETHOPRIM 800; 160 MG/1; MG/1
TABLET ORAL
COMMUNITY
Start: 2021-12-13

## 2021-12-20 SDOH — ECONOMIC STABILITY: FOOD INSECURITY: WITHIN THE PAST 12 MONTHS, YOU WORRIED THAT YOUR FOOD WOULD RUN OUT BEFORE YOU GOT MONEY TO BUY MORE.: PATIENT DECLINED

## 2021-12-20 SDOH — ECONOMIC STABILITY: INCOME INSECURITY: IN THE LAST 12 MONTHS, WAS THERE A TIME WHEN YOU WERE NOT ABLE TO PAY THE MORTGAGE OR RENT ON TIME?: PATIENT REFUSED

## 2021-12-20 SDOH — HEALTH STABILITY: PHYSICAL HEALTH: ON AVERAGE, HOW MANY MINUTES DO YOU ENGAGE IN EXERCISE AT THIS LEVEL?: 30 MIN

## 2021-12-20 SDOH — ECONOMIC STABILITY: HOUSING INSECURITY
IN THE LAST 12 MONTHS, WAS THERE A TIME WHEN YOU DID NOT HAVE A STEADY PLACE TO SLEEP OR SLEPT IN A SHELTER (INCLUDING NOW)?: NO

## 2021-12-20 SDOH — ECONOMIC STABILITY: TRANSPORTATION INSECURITY
IN THE PAST 12 MONTHS, HAS LACK OF RELIABLE TRANSPORTATION KEPT YOU FROM MEDICAL APPOINTMENTS, MEETINGS, WORK OR FROM GETTING THINGS NEEDED FOR DAILY LIVING?: NO

## 2021-12-20 SDOH — ECONOMIC STABILITY: INCOME INSECURITY: HOW HARD IS IT FOR YOU TO PAY FOR THE VERY BASICS LIKE FOOD, HOUSING, MEDICAL CARE, AND HEATING?: SOMEWHAT HARD

## 2021-12-20 SDOH — HEALTH STABILITY: MENTAL HEALTH
STRESS IS WHEN SOMEONE FEELS TENSE, NERVOUS, ANXIOUS, OR CAN'T SLEEP AT NIGHT BECAUSE THEIR MIND IS TROUBLED. HOW STRESSED ARE YOU?: TO SOME EXTENT

## 2021-12-20 SDOH — ECONOMIC STABILITY: TRANSPORTATION INSECURITY
IN THE PAST 12 MONTHS, HAS THE LACK OF TRANSPORTATION KEPT YOU FROM MEDICAL APPOINTMENTS OR FROM GETTING MEDICATIONS?: NO

## 2021-12-20 SDOH — ECONOMIC STABILITY: HOUSING INSECURITY
IN THE LAST 12 MONTHS, WAS THERE A TIME WHEN YOU DID NOT HAVE A STEADY PLACE TO SLEEP OR SLEPT IN A SHELTER (INCLUDING NOW)?: PATIENT REFUSED

## 2021-12-20 SDOH — ECONOMIC STABILITY: HOUSING INSECURITY: IN THE LAST 12 MONTHS, HOW MANY PLACES HAVE YOU LIVED?: 1

## 2021-12-20 SDOH — HEALTH STABILITY: PHYSICAL HEALTH: ON AVERAGE, HOW MANY DAYS PER WEEK DO YOU ENGAGE IN MODERATE TO STRENUOUS EXERCISE (LIKE A BRISK WALK)?: 2 DAYS

## 2021-12-20 SDOH — ECONOMIC STABILITY: FOOD INSECURITY: WITHIN THE PAST 12 MONTHS, THE FOOD YOU BOUGHT JUST DIDN'T LAST AND YOU DIDN'T HAVE MONEY TO GET MORE.: PATIENT DECLINED

## 2021-12-20 SDOH — ECONOMIC STABILITY: TRANSPORTATION INSECURITY
IN THE PAST 12 MONTHS, HAS LACK OF TRANSPORTATION KEPT YOU FROM MEETINGS, WORK, OR FROM GETTING THINGS NEEDED FOR DAILY LIVING?: NO

## 2021-12-20 ASSESSMENT — SOCIAL DETERMINANTS OF HEALTH (SDOH)
IN A TYPICAL WEEK, HOW MANY TIMES DO YOU TALK ON THE PHONE WITH FAMILY, FRIENDS, OR NEIGHBORS?: PATIENT DECLINED
IN A TYPICAL WEEK, HOW MANY TIMES DO YOU TALK ON THE PHONE WITH FAMILY, FRIENDS, OR NEIGHBORS?: PATIENT DECLINED
HOW OFTEN DO YOU HAVE SIX OR MORE DRINKS ON ONE OCCASION: NEVER
HOW OFTEN DO YOU HAVE A DRINK CONTAINING ALCOHOL: MONTHLY OR LESS
HOW OFTEN DO YOU ATTEND CHURCH OR RELIGIOUS SERVICES?: PATIENT DECLINED
HOW OFTEN DO YOU ATTEND CHURCH OR RELIGIOUS SERVICES?: PATIENT DECLINED
HOW OFTEN DO YOU ATTENT MEETINGS OF THE CLUB OR ORGANIZATION YOU BELONG TO?: PATIENT DECLINED
WITHIN THE PAST 12 MONTHS, YOU WORRIED THAT YOUR FOOD WOULD RUN OUT BEFORE YOU GOT THE MONEY TO BUY MORE: PATIENT DECLINED
DO YOU BELONG TO ANY CLUBS OR ORGANIZATIONS SUCH AS CHURCH GROUPS UNIONS, FRATERNAL OR ATHLETIC GROUPS, OR SCHOOL GROUPS?: PATIENT DECLINED
HOW MANY DRINKS CONTAINING ALCOHOL DO YOU HAVE ON A TYPICAL DAY WHEN YOU ARE DRINKING: 1 OR 2
HOW HARD IS IT FOR YOU TO PAY FOR THE VERY BASICS LIKE FOOD, HOUSING, MEDICAL CARE, AND HEATING?: SOMEWHAT HARD
HOW OFTEN DO YOU GET TOGETHER WITH FRIENDS OR RELATIVES?: PATIENT DECLINED
HOW OFTEN DO YOU GET TOGETHER WITH FRIENDS OR RELATIVES?: PATIENT DECLINED
DO YOU BELONG TO ANY CLUBS OR ORGANIZATIONS SUCH AS CHURCH GROUPS UNIONS, FRATERNAL OR ATHLETIC GROUPS, OR SCHOOL GROUPS?: PATIENT DECLINED
HOW OFTEN DO YOU ATTENT MEETINGS OF THE CLUB OR ORGANIZATION YOU BELONG TO?: PATIENT DECLINED

## 2021-12-20 ASSESSMENT — LIFESTYLE VARIABLES
HOW OFTEN DO YOU HAVE SIX OR MORE DRINKS ON ONE OCCASION: NEVER
HOW MANY STANDARD DRINKS CONTAINING ALCOHOL DO YOU HAVE ON A TYPICAL DAY: 1 OR 2
HOW OFTEN DO YOU HAVE A DRINK CONTAINING ALCOHOL: MONTHLY OR LESS

## 2021-12-20 ASSESSMENT — FIBROSIS 4 INDEX: FIB4 SCORE: 1.38

## 2021-12-20 NOTE — TELEPHONE ENCOUNTER
I didn't test her for any communicable diseases, are there specific diseases that they need her to be screened for? Usually TB, hep B and C are the main ones. I can add these to her labs if she would like.    SHANNON Naylor.

## 2021-12-20 NOTE — PROGRESS NOTES
"Subjective:   Bruna Masterson is a 51 y.o. 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 Diet controlled. Due for labs.        Current medicines (including changes today)  Current Outpatient Medications   Medication Sig Dispense Refill   • sulfamethoxazole-trimethoprim (BACTRIM DS) 800-160 MG tablet      • naproxen (NAPROSYN) 500 MG Tab Take 1 tablet by mouth 2 times a day with meals. 60 tablet 0     No current facility-administered medications for this visit.     She  has a past medical history of Allergy and Hyperlipidemia.    ROS   No chest pain, no shortness of breath, no abdominal pain  Positive ROS as per HPI.  All other systems reviewed and are negative.     Objective:     /76 (BP Location: Right arm, Patient Position: Sitting, BP Cuff Size: Adult)   Pulse 72   Temp 36 °C (96.8 °F) (Temporal)   Ht 1.575 m (5' 2\")   Wt 76.2 kg (168 lb)   SpO2 96%  Body mass index is 30.73 kg/m².     Physical Exam:  Constitutional: Alert, no distress.  Skin: Warm, dry, good turgor, no rashes in visible areas.  Eye: Equal, round and reactive, conjunctiva clear, lids normal.  ENMT: Lips without lesions, good dentition, oropharynx clear.  Neck: Trachea midline, no masses, no thyromegaly. No cervical or supraclavicular lymphadenopathy  Respiratory: Unlabored respiratory effort, lungs clear to auscultation, no wheezes, no ronchi.  Cardiovascular: Normal S1, S2, no murmur, no edema.  Abdomen: Soft, non-tender, no masses, no hepatosplenomegaly.  Psych: Alert and oriented x3, normal affect " and mood.        Assessment and Plan:   The following treatment plan was discussed    1. Annual physical exam  Healthy 51 year old female  Due for annual labs  Health Maintenance reviewed, orders placed as necessary  Patient is clear to work and free of any communicable diseases.   PPD done this year was negative. She is vaccinated for COVID-19. Due for influenza vaccine.   - CBC WITH DIFFERENTIAL; Future  - Comp Metabolic Panel; Future  - Lipid Profile; Future  - TSH WITH REFLEX TO FT4; Future  - VITAMIN D,25 HYDROXY; Future    2. Hyperlipidemia with target LDL less than 130  - Lipid Profile; Future    3. Screen for colon cancer  - Referral to GI for Colonoscopy      Followup: Return in about 1 year (around 12/20/2022).    I have placed the below orders and discussed them with an approved delegating provider. The MA is performing the below orders under the direction of Dr. Mason

## 2021-12-20 NOTE — ASSESSMENT & PLAN NOTE
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

## 2021-12-20 NOTE — TELEPHONE ENCOUNTER
"VOICEMAIL  1. Caller Name: Bruna Masterson  Call Back Number: [phone]    2. Message: Pt left message stating that she was seen today and was given the chart note of her annual physical exam today to give to her employer. Her employer is requesting a statement saying that \"pt is clear to work and free of any communicable diseases\".     Pt states she can  copy or print it off [employer]. Please advise.  "

## 2021-12-23 LAB
25(OH)D3+25(OH)D2 SERPL-MCNC: 47.1 NG/ML (ref 30–100)
ALBUMIN SERPL-MCNC: 4.5 G/DL (ref 3.8–4.9)
ALBUMIN/GLOB SERPL: 1.4 {RATIO} (ref 1.2–2.2)
ALP SERPL-CCNC: 75 IU/L (ref 44–121)
ALT SERPL-CCNC: 21 IU/L (ref 0–32)
AST SERPL-CCNC: 27 IU/L (ref 0–40)
BASOPHILS # BLD AUTO: 0.1 X10E3/UL (ref 0–0.2)
BASOPHILS NFR BLD AUTO: 2 %
BILIRUB SERPL-MCNC: <0.2 MG/DL (ref 0–1.2)
BUN SERPL-MCNC: 20 MG/DL (ref 6–24)
BUN/CREAT SERPL: 20 (ref 9–23)
CALCIUM SERPL-MCNC: 9.9 MG/DL (ref 8.7–10.2)
CHLORIDE SERPL-SCNC: 102 MMOL/L (ref 96–106)
CHOLEST SERPL-MCNC: 229 MG/DL (ref 100–199)
CO2 SERPL-SCNC: 24 MMOL/L (ref 20–29)
CREAT SERPL-MCNC: 1 MG/DL (ref 0.57–1)
EOSINOPHIL # BLD AUTO: 0.1 X10E3/UL (ref 0–0.4)
EOSINOPHIL NFR BLD AUTO: 3 %
ERYTHROCYTE [DISTWIDTH] IN BLOOD BY AUTOMATED COUNT: 12.5 % (ref 11.7–15.4)
GLOBULIN SER CALC-MCNC: 3.2 G/DL (ref 1.5–4.5)
GLUCOSE SERPL-MCNC: 88 MG/DL (ref 65–99)
HCT VFR BLD AUTO: 45 % (ref 34–46.6)
HDLC SERPL-MCNC: 62 MG/DL
HGB BLD-MCNC: 14.9 G/DL (ref 11.1–15.9)
IMM GRANULOCYTES # BLD AUTO: 0 X10E3/UL (ref 0–0.1)
IMM GRANULOCYTES NFR BLD AUTO: 0 %
IMMATURE CELLS  115398: NORMAL
LABORATORY COMMENT REPORT: ABNORMAL
LDLC SERPL CALC-MCNC: 152 MG/DL (ref 0–99)
LYMPHOCYTES # BLD AUTO: 1 X10E3/UL (ref 0.7–3.1)
LYMPHOCYTES NFR BLD AUTO: 25 %
MCH RBC QN AUTO: 31 PG (ref 26.6–33)
MCHC RBC AUTO-ENTMCNC: 33.1 G/DL (ref 31.5–35.7)
MCV RBC AUTO: 94 FL (ref 79–97)
MONOCYTES # BLD AUTO: 0.9 X10E3/UL (ref 0.1–0.9)
MONOCYTES NFR BLD AUTO: 22 %
MORPHOLOGY BLD-IMP: NORMAL
NEUTROPHILS # BLD AUTO: 1.9 X10E3/UL (ref 1.4–7)
NEUTROPHILS NFR BLD AUTO: 48 %
NRBC BLD AUTO-RTO: NORMAL %
PLATELET # BLD AUTO: 226 X10E3/UL (ref 150–450)
POTASSIUM SERPL-SCNC: 4.5 MMOL/L (ref 3.5–5.2)
PROT SERPL-MCNC: 7.7 G/DL (ref 6–8.5)
RBC # BLD AUTO: 4.8 X10E6/UL (ref 3.77–5.28)
SODIUM SERPL-SCNC: 139 MMOL/L (ref 134–144)
TRIGL SERPL-MCNC: 86 MG/DL (ref 0–149)
TSH SERPL DL<=0.005 MIU/L-ACNC: 3.62 UIU/ML (ref 0.45–4.5)
VLDLC SERPL CALC-MCNC: 15 MG/DL (ref 5–40)
WBC # BLD AUTO: 3.9 X10E3/UL (ref 3.4–10.8)

## 2022-01-03 ENCOUNTER — PATIENT MESSAGE (OUTPATIENT)
Dept: MEDICAL GROUP | Facility: MEDICAL CENTER | Age: 52
End: 2022-01-03

## 2022-01-03 NOTE — TELEPHONE ENCOUNTER
1. Caller Name: Bruna Masterson  all Back Number: [phone] (home)     How would the patient prefer to be contacted with a response: [employer] message    Spoke to pt, form that she dropped off today needs to have the same information that was provided during her virtual visit on 12/10/2021 in regards to taking care of her mother.     She is also requesting a note from PCP stating she may return to work. Please advise. Pt requesting [employer] message when completed, she will print off from [employer].

## 2022-01-03 NOTE — LETTER
January 3, 2022       Patient: Bruna Masterson   YOB: 1970   Date of Visit: 1/3/2022         To Whom It May Concern:    In my medical opinion, I recommend that Bruna Masterson return to full duty, no restrictions, 1/5/2022.     If you have any questions or concerns, please don't hesitate to call [phone]          Sincerely,          Physician Braswell  Electronically Signed

## 2022-01-03 NOTE — PATIENT COMMUNICATION
Patient dropped off paperwork in office today at 9:26am and patient requested paperwork to be filled out today and picked up today. Paperwork given to Medical Assistant to be filled out by provider. Patient advised that paperwork may not be filled out today due to provider's full schedule and patient gave verbal understanding.

## 2022-03-21 ENCOUNTER — NON-PROVIDER VISIT (OUTPATIENT)
Dept: MEDICAL GROUP | Facility: MEDICAL CENTER | Age: 52
End: 2022-03-21
Payer: COMMERCIAL

## 2022-03-21 DIAGNOSIS — Z11.1 PPD SCREENING TEST: ICD-10-CM

## 2022-03-21 PROCEDURE — 86580 TB INTRADERMAL TEST: CPT | Performed by: NURSE PRACTITIONER

## 2022-03-21 NOTE — PROGRESS NOTES
Zay Masterson is a 51 y.o. female here for a non-provider visit for PPD placement -- Step 1 of 2    Reason for PPD:  work requirement    1. TB evaluation questionnaire completed by patient? Yes      -  If any answers marked yes did you contact a provider prior to placing? Not Indicated  2.  Patient notified to return to clinic for reading on: 3/23-3/24  3.  PPD Placement documentation completed on TB evaluation questionnaire? Yes  4.  Location of TB evaluation questionnaire filed: [facility]

## 2022-03-28 ENCOUNTER — NON-PROVIDER VISIT (OUTPATIENT)
Dept: MEDICAL GROUP | Facility: MEDICAL CENTER | Age: 52
End: 2022-03-28
Payer: COMMERCIAL

## 2022-03-28 DIAGNOSIS — Z11.1 PPD SCREENING TEST: ICD-10-CM

## 2022-03-28 PROCEDURE — 86580 TB INTRADERMAL TEST: CPT | Performed by: NURSE PRACTITIONER

## 2022-03-28 NOTE — PROGRESS NOTES
Zay Masterson is a 51 y.o. female here for a non-provider visit for PPD placement -- Step 1 of 1    Reason for PPD:  work requirement    1. TB evaluation questionnaire completed by patient? Yes      -  If any answers marked yes did you contact a provider prior to placing? Not Indicated  2.  Patient notified to return to clinic for reading on: Wednesday 3/30 after 10:17 AM OR Thursday 3/31 before 10:17 AM  3.  PPD Placement documentation completed on TB evaluation questionnaire? Yes  4.  Location of TB evaluation questionnaire filed: [facility]

## 2022-04-06 ENCOUNTER — NON-PROVIDER VISIT (OUTPATIENT)
Dept: MEDICAL GROUP | Facility: MEDICAL CENTER | Age: 52
End: 2022-04-06
Payer: COMMERCIAL

## 2022-04-06 DIAGNOSIS — Z11.1 PPD SCREENING TEST: ICD-10-CM

## 2022-04-06 PROCEDURE — 86580 TB INTRADERMAL TEST: CPT | Performed by: NURSE PRACTITIONER

## 2022-04-06 NOTE — PROGRESS NOTES
Zay Masterson is a 51 y.o. female here for a non-provider visit for PPD placement -- Step 1 of 1    Reason for PPD:  work requirement    1. TB evaluation questionnaire completed by patient? Yes      -  If any answers marked yes did you contact a provider prior to placing? Not Indicated  2.  Patient notified to return to clinic for reading on: after Friday 4/8/2022 at 10:23am and before Saturday 4/9/2022 at 10:23am. Patient was advices that clinic is close on Saturdays. Patient verbally agreed that her TB test will need to be read on Friday for results  3.  PPD Placement documentation completed on TB evaluation questionnaire? Yes  4.  Location of TB evaluation questionnaire filed: [facility].

## 2022-04-08 ENCOUNTER — NON-PROVIDER VISIT (OUTPATIENT)
Dept: MEDICAL GROUP | Facility: MEDICAL CENTER | Age: 52
End: 2022-04-08
Payer: COMMERCIAL

## 2022-04-08 LAB
TB WHEAL 3D P 5 TU DIAM: NORMAL MM
TB WHEAL 3D P 5 TU DIAM: NORMAL MM

## 2022-04-08 PROCEDURE — 99999 PR NO CHARGE: CPT | Performed by: NURSE PRACTITIONER

## 2022-04-08 NOTE — PROGRESS NOTES
Zay Masterson is a 51 y.o. female here for a non-provider visit for PPD reading -- Step 1 of 1.      1.  Resulted in Epic under enter/edit results? Yes   2.  TB evaluation questionnaire scanned into chart and original given to patient?No      3. Was induration greater than 0 mm? No.    If Step 1 of 2, when is patient returning for second step (delete if N/A):NA     Routed to PCP? No

## 2022-06-29 ENCOUNTER — APPOINTMENT (RX ONLY)
Dept: URBAN - METROPOLITAN AREA CLINIC 6 | Facility: CLINIC | Age: 52
Setting detail: DERMATOLOGY
End: 2022-06-29

## 2022-06-29 DIAGNOSIS — D18.0 HEMANGIOMA: ICD-10-CM

## 2022-06-29 DIAGNOSIS — Z71.89 OTHER SPECIFIED COUNSELING: ICD-10-CM

## 2022-06-29 DIAGNOSIS — L82.1 OTHER SEBORRHEIC KERATOSIS: ICD-10-CM

## 2022-06-29 DIAGNOSIS — D22 MELANOCYTIC NEVI: ICD-10-CM

## 2022-06-29 DIAGNOSIS — L81.4 OTHER MELANIN HYPERPIGMENTATION: ICD-10-CM

## 2022-06-29 PROBLEM — D18.01 HEMANGIOMA OF SKIN AND SUBCUTANEOUS TISSUE: Status: ACTIVE | Noted: 2022-06-29

## 2022-06-29 PROBLEM — D22.5 MELANOCYTIC NEVI OF TRUNK: Status: ACTIVE | Noted: 2022-06-29

## 2022-06-29 PROCEDURE — 99203 OFFICE O/P NEW LOW 30 MIN: CPT

## 2022-06-29 PROCEDURE — ? COUNSELING

## 2022-06-29 PROCEDURE — ? PHOTO-DOCUMENTATION

## 2022-06-29 ASSESSMENT — LOCATION SIMPLE DESCRIPTION DERM
LOCATION SIMPLE: RIGHT LOWER BACK
LOCATION SIMPLE: ABDOMEN
LOCATION SIMPLE: CHEST
LOCATION SIMPLE: RIGHT UPPER BACK
LOCATION SIMPLE: LEFT UPPER BACK
LOCATION SIMPLE: LEFT LOWER BACK
LOCATION SIMPLE: LEFT FOREARM
LOCATION SIMPLE: LEFT THIGH
LOCATION SIMPLE: INFERIOR FOREHEAD
LOCATION SIMPLE: RIGHT LIP
LOCATION SIMPLE: RIGHT FOREARM
LOCATION SIMPLE: RIGHT THIGH
LOCATION SIMPLE: LEFT UPPER ARM
LOCATION SIMPLE: LOWER BACK
LOCATION SIMPLE: RIGHT UPPER ARM

## 2022-06-29 ASSESSMENT — LOCATION DETAILED DESCRIPTION DERM
LOCATION DETAILED: RIGHT ANTERIOR DISTAL THIGH
LOCATION DETAILED: RIGHT VENTRAL PROXIMAL FOREARM
LOCATION DETAILED: RIGHT INFERIOR MEDIAL MIDBACK
LOCATION DETAILED: LEFT ANTERIOR PROXIMAL UPPER ARM
LOCATION DETAILED: LEFT ANTERIOR DISTAL THIGH
LOCATION DETAILED: LEFT INFERIOR MEDIAL MIDBACK
LOCATION DETAILED: LEFT MID-UPPER BACK
LOCATION DETAILED: SUPERIOR LUMBAR SPINE
LOCATION DETAILED: RIGHT INFERIOR VERMILION LIP
LOCATION DETAILED: RIGHT MID-UPPER BACK
LOCATION DETAILED: EPIGASTRIC SKIN
LOCATION DETAILED: RIGHT ANTERIOR PROXIMAL UPPER ARM
LOCATION DETAILED: LEFT VENTRAL PROXIMAL FOREARM
LOCATION DETAILED: RIGHT SUPERIOR MEDIAL UPPER BACK
LOCATION DETAILED: RIGHT SUPERIOR UPPER BACK
LOCATION DETAILED: INFERIOR MID FOREHEAD
LOCATION DETAILED: UPPER STERNUM

## 2022-06-29 ASSESSMENT — LOCATION ZONE DERM
LOCATION ZONE: FACE
LOCATION ZONE: LEG
LOCATION ZONE: TRUNK
LOCATION ZONE: ARM
LOCATION ZONE: LIP

## 2022-06-29 NOTE — PROCEDURE: PHOTO-DOCUMENTATION
Photo Preface (Leave Blank If You Do Not Want): Photographs were obtained today
Detail Level: Detailed
Details (Free Text): Size 0.4mm

## 2022-06-29 NOTE — HPI: FULL BODY SKIN EXAMINATION
How Severe Are Your Spot(S)?: mild
Impression: Age-related nuclear cataract, left eye: H25.12.  Plan: Continue to monitor
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 Left. Plan: Discussed diagnosis in detail with patient. Recommend starting Tobradex QID OS x 1 week up to 2 week max then must D/C, warm compresses several times per day. Glaucoma warnings given.
Impression: Presence of intraocular lens: Z96.1.  Plan: Continue to monitor
Impression: S/P VItrectomy w/Rem Gas Bubble OD - 1 Day. Post operative instructions reviewed - looks good Plan: Continue to monitor. Continue Pred Forte and Ocuflox as directed. Patient face down 1 week or as directed by Dr Paulino Torrez.
What Type Of Note Output Would You Prefer (Optional)?: Bullet Format
What Is The Reason For Today's Visit?: Full Body Skin Examination
What Is The Reason For Today's Visit? (Being Monitored For X): concerning skin lesions on an annual basis

## 2022-08-26 ENCOUNTER — OFFICE VISIT (OUTPATIENT)
Dept: URGENT CARE | Facility: CLINIC | Age: 52
End: 2022-08-26
Payer: COMMERCIAL

## 2022-08-26 VITALS
HEART RATE: 58 BPM | WEIGHT: 151 LBS | HEIGHT: 62 IN | SYSTOLIC BLOOD PRESSURE: 114 MMHG | BODY MASS INDEX: 27.79 KG/M2 | OXYGEN SATURATION: 98 % | DIASTOLIC BLOOD PRESSURE: 70 MMHG | RESPIRATION RATE: 14 BRPM | TEMPERATURE: 97.8 F

## 2022-08-26 DIAGNOSIS — U07.1 COVID-19 VIRUS INFECTION: Primary | ICD-10-CM

## 2022-08-26 DIAGNOSIS — Z20.822 SUSPECTED COVID-19 VIRUS INFECTION: ICD-10-CM

## 2022-08-26 DIAGNOSIS — J02.0 ACUTE STREPTOCOCCAL PHARYNGITIS: ICD-10-CM

## 2022-08-26 DIAGNOSIS — J02.9 PHARYNGITIS, UNSPECIFIED ETIOLOGY: ICD-10-CM

## 2022-08-26 LAB
EXTERNAL QUALITY CONTROL: ABNORMAL
INT CON NEG: ABNORMAL
INT CON NEG: NORMAL
INT CON POS: ABNORMAL
INT CON POS: NORMAL
S PYO AG THROAT QL: POSITIVE
SARS-COV+SARS-COV-2 AG RESP QL IA.RAPID: POSITIVE

## 2022-08-26 PROCEDURE — 99213 OFFICE O/P EST LOW 20 MIN: CPT | Mod: CS | Performed by: NURSE PRACTITIONER

## 2022-08-26 PROCEDURE — 87426 SARSCOV CORONAVIRUS AG IA: CPT | Performed by: NURSE PRACTITIONER

## 2022-08-26 PROCEDURE — 87880 STREP A ASSAY W/OPTIC: CPT | Performed by: NURSE PRACTITIONER

## 2022-08-26 RX ORDER — CEFDINIR 300 MG/1
300 CAPSULE ORAL 2 TIMES DAILY
Qty: 20 CAPSULE | Refills: 0 | Status: SHIPPED | OUTPATIENT
Start: 2022-08-26 | End: 2022-09-05

## 2022-08-26 ASSESSMENT — FIBROSIS 4 INDEX: FIB4 SCORE: 1.36

## 2022-08-26 ASSESSMENT — ENCOUNTER SYMPTOMS
SORE THROAT: 1
MYALGIAS: 1
WHEEZING: 0
FEVER: 1
CHILLS: 1
COUGH: 1
SHORTNESS OF BREATH: 0
SPUTUM PRODUCTION: 0
NAUSEA: 1

## 2022-08-26 NOTE — LETTER
August 26, 2022    To Whom It May Concern:         This is confirmation that Bruna Masterson attended her scheduled appointment with ANITRA Stout on 8/26/22.   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     The Counts include 234 beds at the [facility] department should be contacting you for further instructions on duration of self-isolation and return to work protocol.  In general, this will also follow the CDC guidelines with a minimum of 5 days from the onset of symptoms and without fever, vomiting, or diarrhea for an additional 24 hour period following the 5 day isolation.      In general, repeat testing is NOT necessary and not offered through the [facility] care.     This is the only note that will be provided from the [facility] for this visit.  Your employee will require an appointment with a primary care provider if FMLA or disability forms are required.         If you have any questions please do not hesitate to call me at the phone number listed below.    Sincerely,          SHANNON Stout.  [phone]

## 2022-08-27 NOTE — PROGRESS NOTES
Subjective:     Bruna Masterson is a 51 y.o. female who presents for Sore Throat (X 3 days, sore throat, fatigue, low grade fever, cough, exposed to covid.)      HPI  Pt presents for evaluation of a new problem. Zay is a pleasant 51-year-old female presents to urgent care today with complaints of covid symptoms that started 3 days ago.  Her symptoms include congestion, sore throat, fatigue, headache, fever/chills.  She states that her  did test positive for COVID earlier this week.  She has had 2 previous COVID infections and is vaccinated against COVID.  She has been using zinc, vitamin C and elderberry for her symptoms in addition to Mucinex.  Her symptoms are progressively improving.      Review of Systems   Constitutional:  Positive for chills, fever and malaise/fatigue.   HENT:  Positive for congestion, ear pain and sore throat.    Respiratory:  Positive for cough. Negative for sputum production, shortness of breath and wheezing.    Gastrointestinal:  Positive for nausea.   Musculoskeletal:  Positive for myalgias.     PMH:   Past Medical History:   Diagnosis Date    Allergy     Hyperlipidemia      ALLERGIES:   Allergies   Allergen Reactions    Amoxicillin Anaphylaxis, Hives and Swelling     Other reaction(s): Angioedema     SURGHX:   Past Surgical History:   Procedure Laterality Date    OTHER ORTHOPEDIC SURGERY      R ankle repair 2004  L wrist repair 2008     SOCHX:   Social History     Socioeconomic History    Marital status:     Highest education level: 12th grade   Tobacco Use    Smoking status: Never    Smokeless tobacco: Never   Vaping Use    Vaping Use: Never used   Substance and Sexual Activity    Alcohol use: Not Currently     Comment: once an a while    Drug use: No     Social Determinants of Health     Financial Resource Strain: Medium Risk    Difficulty of Paying Living Expenses: Somewhat hard   Food Insecurity: Unknown    Worried About Running Out of Food in the Last Year:  "Patient refused    Ran Out of Food in the Last Year: Patient refused   Transportation Needs: No Transportation Needs    Lack of Transportation (Medical): No    Lack of Transportation (Non-Medical): No   Physical Activity: Insufficiently Active    Days of Exercise per Week: 2 days    Minutes of Exercise per Session: 30 min   Stress: Stress Concern Present    Feeling of Stress : To some extent   Social Connections: Unknown    Frequency of Communication with Friends and Family: Patient refused    Frequency of Social Gatherings with Friends and Family: Patient refused    Attends Caodaism Services: Patient refused    Active Member of Clubs or Organizations: Patient refused    Attends Club or Organization Meetings: Patient refused    Marital Status:    Housing Stability: Unknown    Unable to Pay for Housing in the Last Year: Patient refused    Number of Places Lived in the Last Year: 1    Unstable Housing in the Last Year: No     FH:   Family History   Problem Relation Age of Onset    Glaucoma Mother     Arthritis Mother     Heart Disease Father         CHF, MI    Hyperlipidemia Father     Heart Disease Paternal Aunt         CVA    Heart Disease Paternal Uncle         pacers    Cancer Cousin 60        breast         Objective:   /70   Pulse (!) 58   Temp 36.6 °C (97.8 °F) (Temporal)   Resp 14   Ht 1.575 m (5' 2\")   Wt 68.5 kg (151 lb)   SpO2 98%   BMI 27.62 kg/m²     Physical Exam  Vitals and nursing note reviewed.   Constitutional:       General: She is not in acute distress.     Appearance: Normal appearance. She is normal weight. She is ill-appearing.   HENT:      Head: Normocephalic and atraumatic.      Right Ear: Tympanic membrane, ear canal and external ear normal. There is no impacted cerumen.      Left Ear: Tympanic membrane, ear canal and external ear normal. There is no impacted cerumen.      Nose: Congestion present. No rhinorrhea.      Mouth/Throat:      Mouth: Mucous membranes are moist. "      Pharynx: Posterior oropharyngeal erythema present. No oropharyngeal exudate.   Eyes:      General:         Right eye: No discharge.         Left eye: No discharge.      Extraocular Movements: Extraocular movements intact.      Pupils: Pupils are equal, round, and reactive to light.   Cardiovascular:      Rate and Rhythm: Regular rhythm. Bradycardia present.      Pulses: Normal pulses.      Heart sounds: Normal heart sounds.   Pulmonary:      Effort: Pulmonary effort is normal. No respiratory distress.      Breath sounds: Normal breath sounds. No stridor. No wheezing, rhonchi or rales.   Chest:      Chest wall: No tenderness.   Abdominal:      General: Abdomen is flat. Bowel sounds are normal.      Palpations: Abdomen is soft.      Tenderness: There is no abdominal tenderness. There is no right CVA tenderness or left CVA tenderness.   Musculoskeletal:         General: Normal range of motion.      Cervical back: Normal range of motion and neck supple. Tenderness present.   Lymphadenopathy:      Cervical: Cervical adenopathy present.   Skin:     General: Skin is warm and dry.      Capillary Refill: Capillary refill takes less than 2 seconds.   Neurological:      General: No focal deficit present.      Mental Status: She is alert and oriented to person, place, and time. Mental status is at baseline.   Psychiatric:         Mood and Affect: Mood normal.         Behavior: Behavior normal.         Thought Content: Thought content normal.         Judgment: Judgment normal.     POCT COVID: Positive  POCT strep: Positive  Assessment/Plan:   Assessment    1. COVID-19 virus infection        2. Acute streptococcal pharyngitis  cefdinir (OMNICEF) 300 MG Cap      3. Pharyngitis, unspecified etiology  POCT Rapid Strep A    POCT SARS-COV Antigen ОЛЕГ (Symptomatic only)      4. Suspected COVID-19 virus infection  POCT SARS-COV Antigen ОЛЕГ (Symptomatic only)        Isolation guidelines reviewed.  She did test positive for Both  COVID and strep today.  Cefdinir was sent to pharmacy for treatment of strep as she is allergic to amoxicillin.  She states that she has tolerated cephalosporins in the past.  Work note provided today. We discussed supportive measures including humidifier, warm salt water gargles, over-the-counter Cepacol throat lozenges, rest  and increased fluids. Pt was encouraged to seek treatment back in the ER or urgent care for worsening symptoms,  fever greater than 100.5, wheezes or shortness of breath.    AVS handout given and reviewed with patient. Pt educated on red flags and when to seek treatment back in ER or UC.

## 2022-09-19 ENCOUNTER — TELEPHONE (OUTPATIENT)
Dept: MEDICAL GROUP | Facility: MEDICAL CENTER | Age: 52
End: 2022-09-19
Payer: COMMERCIAL

## 2022-09-19 NOTE — TELEPHONE ENCOUNTER
Patient called advised she left her disabillity papers 4 weeks ago and havent  heard if they are completed by Mrs Garcia I have not seen them but will ask Mrs Garcia and send her a message as well regarding patients questions

## 2022-09-28 ENCOUNTER — OFFICE VISIT (OUTPATIENT)
Dept: MEDICAL GROUP | Facility: MEDICAL CENTER | Age: 52
End: 2022-09-28
Payer: COMMERCIAL

## 2022-09-28 VITALS
HEART RATE: 55 BPM | DIASTOLIC BLOOD PRESSURE: 84 MMHG | WEIGHT: 155.4 LBS | TEMPERATURE: 97.1 F | OXYGEN SATURATION: 100 % | SYSTOLIC BLOOD PRESSURE: 122 MMHG | BODY MASS INDEX: 28.6 KG/M2 | HEIGHT: 62 IN | RESPIRATION RATE: 16 BRPM

## 2022-09-28 DIAGNOSIS — Z02.89 ENCOUNTER FOR COMPLETION OF FORM WITH PATIENT: ICD-10-CM

## 2022-09-28 PROCEDURE — 99212 OFFICE O/P EST SF 10 MIN: CPT | Performed by: NURSE PRACTITIONER

## 2022-09-28 ASSESSMENT — FIBROSIS 4 INDEX: FIB4 SCORE: 1.38

## 2022-09-28 NOTE — ASSESSMENT & PLAN NOTE
Patient requesting paperwork for short term disability after injury to right wrist which happened last year. Wrist issue has resolved with time

## 2022-11-17 NOTE — PROGRESS NOTES
"Subjective:   Bruna Masterson is a 52 y.o. female here today for short term disability paperwork    Encounter for completion of form with patient  Patient requesting paperwork for short term disability after injury to right wrist which happened last year. Wrist issue has resolved with time       Current medicines (including changes today)  Current Outpatient Medications   Medication Sig Dispense Refill    sulfamethoxazole-trimethoprim (BACTRIM DS) 800-160 MG tablet  (Patient not taking: Reported on 8/26/2022)      naproxen (NAPROSYN) 500 MG Tab Take 1 tablet by mouth 2 times a day with meals. (Patient not taking: Reported on 8/26/2022) 60 tablet 0     No current facility-administered medications for this visit.     She  has a past medical history of Allergy and Hyperlipidemia.    ROS   No chest pain, no shortness of breath, no abdominal pain  Positive ROS as per HPI.  All other systems reviewed and are negative.     Objective:     /84   Pulse (!) 55   Temp 36.2 °C (97.1 °F) (Temporal)   Resp 16   Ht 1.575 m (5' 2\")   Wt 70.5 kg (155 lb 6.4 oz)   SpO2 100%  Body mass index is 28.42 kg/m². \    Physical Exam:  Constitutional: Alert, no distress.  Skin: Warm, dry, good turgor, no rashes in visible areas.  Eye: Equal, round and reactive, conjunctiva clear, lids normal.  ENMT: Mask in place  Respiratory: Unlabored respiratory effort  Psych: Alert and oriented x3, normal affect and mood.        Assessment and Plan:   The following treatment plan was discussed    1. Encounter for completion of form with patient  Prior records reviewed in depth including imaging and office visits.   Short term disability paperwork completed, scanned, returned to patient.       Followup: Return if symptoms worsen or fail to improve.    The MA is performing the above orders under the direction of Dr. Mason    Please note that this dictation was created using voice recognition software. I have made every reasonable attempt to " correct obvious errors, but I expect that there are errors of grammar and possibly content that I did not discover before finalizing the note.

## 2023-02-07 ENCOUNTER — NON-PROVIDER VISIT (OUTPATIENT)
Dept: MEDICAL GROUP | Facility: MEDICAL CENTER | Age: 53
End: 2023-02-07
Payer: COMMERCIAL

## 2023-02-07 DIAGNOSIS — Z23 NEED FOR VACCINATION: ICD-10-CM

## 2023-02-07 DIAGNOSIS — Z11.1 PPD SCREENING TEST: ICD-10-CM

## 2023-02-07 PROCEDURE — 90686 IIV4 VACC NO PRSV 0.5 ML IM: CPT | Performed by: NURSE PRACTITIONER

## 2023-02-07 PROCEDURE — 86580 TB INTRADERMAL TEST: CPT | Performed by: NURSE PRACTITIONER

## 2023-02-07 PROCEDURE — 90471 IMMUNIZATION ADMIN: CPT | Performed by: NURSE PRACTITIONER

## 2023-02-09 NOTE — NON-PROVIDER
Zay Masterson is a 52 y.o. female here for a non-provider visit for Flu Quad and TB administration.       Reason for injection: Flu, TB administration  Order in MAR?: Yes  Patient supplied?:No  Minimum interval has been met for this injection (per MAR order): Yes    Patient tolerated injection and no adverse effects were observed or reported: Yes    # of Administrations remaining in MAR: 0

## 2023-02-10 ENCOUNTER — NON-PROVIDER VISIT (OUTPATIENT)
Dept: MEDICAL GROUP | Facility: MEDICAL CENTER | Age: 53
End: 2023-02-10
Payer: COMMERCIAL

## 2023-02-10 LAB — TB WHEAL 3D P 5 TU DIAM: NORMAL MM

## 2023-02-13 ENCOUNTER — PATIENT MESSAGE (OUTPATIENT)
Dept: MEDICAL GROUP | Facility: MEDICAL CENTER | Age: 53
End: 2023-02-13
Payer: COMMERCIAL

## 2023-02-13 NOTE — LETTER
SHANNON Naylor.  [id number] [place] [address] [place] [address]  Phone: [phone] - Fax: [phone]        March 1, 2023         Patient: AMY GUERRA   YOB: 1970   Date of Visit: 2/13/2023           To Whom it May Concern:    AMY GUERRA was seen in my clinic on 2/07/2023, she received her Influenza vaccine at that time.     If you have any questions or concerns, please don't hesitate to call.        Sincerely,           ANITRA Naylor  Electronically Signed

## 2023-08-24 ENCOUNTER — PATIENT MESSAGE (OUTPATIENT)
Dept: HEALTH INFORMATION MANAGEMENT | Facility: OTHER | Age: 53
End: 2023-08-24

## 2024-05-18 ENCOUNTER — OFFICE VISIT (OUTPATIENT)
Dept: URGENT CARE | Facility: CLINIC | Age: 54
End: 2024-05-18
Payer: COMMERCIAL

## 2024-05-18 VITALS
OXYGEN SATURATION: 96 % | DIASTOLIC BLOOD PRESSURE: 80 MMHG | BODY MASS INDEX: 30.58 KG/M2 | RESPIRATION RATE: 16 BRPM | HEART RATE: 66 BPM | SYSTOLIC BLOOD PRESSURE: 120 MMHG | WEIGHT: 162 LBS | TEMPERATURE: 97.9 F | HEIGHT: 61 IN

## 2024-05-18 DIAGNOSIS — S05.01XA ABRASION OF RIGHT CORNEA, INITIAL ENCOUNTER: ICD-10-CM

## 2024-05-18 PROCEDURE — 99213 OFFICE O/P EST LOW 20 MIN: CPT | Performed by: PHYSICIAN ASSISTANT

## 2024-05-18 PROCEDURE — 3074F SYST BP LT 130 MM HG: CPT | Performed by: PHYSICIAN ASSISTANT

## 2024-05-18 PROCEDURE — 3079F DIAST BP 80-89 MM HG: CPT | Performed by: PHYSICIAN ASSISTANT

## 2024-05-18 RX ORDER — OFLOXACIN 3 MG/ML
1 SOLUTION/ DROPS OPHTHALMIC EVERY 4 HOURS
Qty: 10 ML | Refills: 0 | Status: SHIPPED | OUTPATIENT
Start: 2024-05-18 | End: 2024-05-25

## 2024-05-18 ASSESSMENT — ENCOUNTER SYMPTOMS
GASTROINTESTINAL NEGATIVE: 1
EYE DISCHARGE: 1
MUSCULOSKELETAL NEGATIVE: 1
CONSTITUTIONAL NEGATIVE: 1
PSYCHIATRIC NEGATIVE: 1
RESPIRATORY NEGATIVE: 1
NEUROLOGICAL NEGATIVE: 1
CARDIOVASCULAR NEGATIVE: 1
EYE REDNESS: 1

## 2024-05-18 NOTE — PROGRESS NOTES
Chief Complaint   Patient presents with    Foreign Body in Eye     Possible foreign body in Rt eye.        HISTORY OF PRESENT ILLNESS: Patient is a 53 y.o. female who presents today because right eye irritation.  She presents with her .  She states that she noticed right foreign body sensation yesterday and was itching it repeatedly.  She believes there was an eyelash in the eye.  She flushed it repeatedly and continues to have foreign body sensation.  She describes pain with bright light but no halos or pressure type pain.  She does have a family history of glaucoma in her mother.  She denies any right-sided headache or epigastric abdominal pain nausea.  She does have an optometrist that she can see next week if necessary.  She is not allergic to antibiotics besides amoxicillin.  She is a contact lens wearer but has not worn them in 1 week.    Patient Active Problem List    Diagnosis Date Noted    Annual physical exam 12/20/2021    Encounter for completion of form with patient 12/10/2021    Axillary lump, left 07/08/2021    Right arm pain 07/08/2021    Major depressive disorder 07/08/2021    Irregular menses 07/20/2020    Weight gain 07/20/2020    Obesity (BMI 30-39.9) 07/20/2020    Right patellofemoral syndrome 11/10/2017    Mass of left breast 05/04/2017    PMDD (premenstrual dysphoric disorder) 12/02/2016    Right knee sprain 01/13/2016    Hyperlipidemia with target LDL less than 130 01/12/2015    Allergy status to other antibiotic agents status 08/17/2010    Allergy status to other antibiotic agents 08/17/2010    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 (Patient not taking: Reported on 2022) 60 tablet 0     No current Epic-ordered facility-administered medications on file.       Past Medical History:   Diagnosis Date    Allergy     Hyperlipidemia        Social History     Tobacco Use    Smoking status: Never    Smokeless tobacco: Never   Vaping Use    Vaping status: Never Used   Substance Use Topics    Alcohol use: Not Currently     Comment: once an a while    Drug use: No       Family Status   Relation Name Status    Mo  Alive    Fa 71     Bro  Alive    PAunt  (Not Specified)    PUnc  (Not Specified)    Cou  Alive     Family History   Problem Relation Age of Onset    Glaucoma Mother     Arthritis Mother     Heart Disease Father         CHF, MI    Hyperlipidemia Father     Heart Disease Paternal Aunt         CVA    Heart Disease Paternal Uncle         pacers    Cancer Cousin 60        breast       Review of Systems   Constitutional: Negative.    HENT:  Negative for ear discharge.    Eyes:  Positive for discharge and redness.   Respiratory: Negative.     Cardiovascular: Negative.    Gastrointestinal: Negative.    Genitourinary: Negative.    Musculoskeletal: Negative.    Skin: Negative.    Neurological: Negative.    Endo/Heme/Allergies: Negative.    Psychiatric/Behavioral: Negative.         Exam:  /80   Pulse 66   Temp 36.6 °C (97.9 °F) (Temporal)   Resp 16   Ht 1.549 m (5' 1\")   Wt 73.5 kg (162 lb)   SpO2 96%     Nursing notes and vitals reviewed.    Constitutional:   Appropriately groomed, pleasant affect, well nourished, in NAD.    Head:   Normocephalic, atraumatic.    Eyes:   PERRLA, EOM's full, right: sclera injected, conjunctiva erythematous, and medial canthus with yellow exudate.  No preauricular lymphadenopathy.      No foreign body identified.  Fairly large uptake of fluorescein dye along the right aspect of the cornea with a focal area of increased uptake in a more diffuse area surrounding with less uptake.  " Another small linear lateral area of uptake inferior to the cornea and sclera.    Eye irrigated copiously with sterile saline.  Patient tolerated well.    Ears:  Hearing grossly intact to voice.    Nose:  Nares patent bilaterally.  Nasal mucosa not edematous.      Throat:  Dentition wnl, mucosa moist without lesions.  Oropharynx not erythematous, with no enlargement of the palatine tonsils bilaterally with no exudates.    No post nasal drainage present.  Soft palate rises symmetrically bilaterally and uvula midline.      Neck: Neck supple, with mild anterior lymphadenopathy that is soft and mobile to palpation. Thyroid non-palpable without tenderness or nodules. No supraclavicular lymphadenopathy.    Lungs:  Respiratory effort not labored without accessory muscle use.     Musculoskeletal:  Gait non-antalgic with a narrow base.    Derm:  Skin without rashes or lesions with good turgor pressure.      Psychiatric:  Mood, affect, and judgement appropriate.    Please note that this dictation was created using voice recognition software. I have made every reasonable attempt to correct obvious errors, but I expect that there are errors of grammar and possibly content that I did not discover before finalizing the note.    Assessment/Plan:  1. Abrasion of right cornea, initial encounter  ofloxacin (OCUFLOX) 0.3 % Solution        Patient presents with corneal abrasion to the right.  Flushed eye with copious amounts of saline and recommended a Cipro based eyedrop given concern for Pseudomonas with contact lens wearing history.  Recommended contacting ophthalmologist or optometrist on Monday for reevaluation.    Instructed to return to Urgent Care or nearest Emergency Department if symptoms fail to improve, for any change in condition, further concerns, or new concerning symptoms. Patient states understanding of the plan of care and discharge instructions.    Neri Butler PA-C

## 2024-10-10 ENCOUNTER — PATIENT MESSAGE (OUTPATIENT)
Dept: HEALTH INFORMATION MANAGEMENT | Facility: OTHER | Age: 54
End: 2024-10-10